# Patient Record
Sex: MALE | Race: OTHER | HISPANIC OR LATINO | Employment: UNEMPLOYED | URBAN - METROPOLITAN AREA
[De-identification: names, ages, dates, MRNs, and addresses within clinical notes are randomized per-mention and may not be internally consistent; named-entity substitution may affect disease eponyms.]

---

## 2019-01-01 ENCOUNTER — APPOINTMENT (OUTPATIENT)
Dept: NON INVASIVE DIAGNOSTICS | Facility: HOSPITAL | Age: 29
End: 2019-01-01
Payer: COMMERCIAL

## 2019-01-01 ENCOUNTER — HOSPITAL ENCOUNTER (OUTPATIENT)
Facility: HOSPITAL | Age: 29
Setting detail: OBSERVATION
Discharge: HOME | End: 2019-11-16
Attending: EMERGENCY MEDICINE | Admitting: INTERNAL MEDICINE
Payer: COMMERCIAL

## 2019-01-01 ENCOUNTER — HOSPITAL ENCOUNTER (EMERGENCY)
Facility: HOSPITAL | Age: 29
Discharge: HOME | End: 2019-01-10
Attending: EMERGENCY MEDICINE
Payer: COMMERCIAL

## 2019-01-01 ENCOUNTER — APPOINTMENT (EMERGENCY)
Dept: CT IMAGING | Facility: HOSPITAL | Age: 29
End: 2019-01-01
Payer: COMMERCIAL

## 2019-01-01 ENCOUNTER — APPOINTMENT (EMERGENCY)
Dept: RADIOLOGY | Facility: HOSPITAL | Age: 29
End: 2019-01-01
Payer: COMMERCIAL

## 2019-01-01 ENCOUNTER — HOSPITAL ENCOUNTER (EMERGENCY)
Facility: HOSPITAL | Age: 29
Discharge: HOME | End: 2019-01-30
Attending: EMERGENCY MEDICINE | Admitting: EMERGENCY MEDICINE
Payer: COMMERCIAL

## 2019-01-01 VITALS
RESPIRATION RATE: 14 BRPM | HEART RATE: 88 BPM | SYSTOLIC BLOOD PRESSURE: 128 MMHG | WEIGHT: 255 LBS | OXYGEN SATURATION: 100 % | TEMPERATURE: 98.1 F | DIASTOLIC BLOOD PRESSURE: 71 MMHG

## 2019-01-01 VITALS
TEMPERATURE: 99.4 F | SYSTOLIC BLOOD PRESSURE: 158 MMHG | OXYGEN SATURATION: 100 % | RESPIRATION RATE: 16 BRPM | DIASTOLIC BLOOD PRESSURE: 95 MMHG | HEART RATE: 110 BPM | WEIGHT: 220.24 LBS

## 2019-01-01 VITALS
OXYGEN SATURATION: 98 % | HEART RATE: 84 BPM | DIASTOLIC BLOOD PRESSURE: 70 MMHG | RESPIRATION RATE: 20 BRPM | SYSTOLIC BLOOD PRESSURE: 127 MMHG | TEMPERATURE: 98.3 F | HEIGHT: 65 IN | WEIGHT: 228 LBS | BODY MASS INDEX: 37.99 KG/M2

## 2019-01-01 DIAGNOSIS — R07.9 CHEST PAIN: Primary | ICD-10-CM

## 2019-01-01 DIAGNOSIS — S86.911A KNEE STRAIN, RIGHT, INITIAL ENCOUNTER: Primary | ICD-10-CM

## 2019-01-01 DIAGNOSIS — K42.9 UMBILICAL HERNIA: ICD-10-CM

## 2019-01-01 DIAGNOSIS — I30.0 ACUTE IDIOPATHIC PERICARDITIS: ICD-10-CM

## 2019-01-01 DIAGNOSIS — S86.911A KNEE STRAIN, RIGHT, INITIAL ENCOUNTER: ICD-10-CM

## 2019-01-01 DIAGNOSIS — R16.0 HEPATOMEGALY: ICD-10-CM

## 2019-01-01 DIAGNOSIS — R10.12 LEFT UPPER QUADRANT PAIN: Primary | ICD-10-CM

## 2019-01-01 DIAGNOSIS — R77.8 ELEVATED TROPONIN: ICD-10-CM

## 2019-01-01 LAB
ALBUMIN SERPL BCP-MCNC: 4.8 G/DL (ref 3–5.2)
ALP SERPL-CCNC: 121 U/L (ref 43–122)
ALT SERPL W P-5'-P-CCNC: 99 U/L (ref 9–52)
AMPHETAMINES UR QL SCN: NEGATIVE NG/ML
ANION GAP SERPL CALCULATED.3IONS-SCNC: 10 MMOL/L (ref 4–13)
ANION GAP SERPL CALCULATED.3IONS-SCNC: 11 MMOL/L (ref 4–13)
ANION GAP SERPL CALCULATED.3IONS-SCNC: 7 MMOL/L (ref 5–14)
AST SERPL W P-5'-P-CCNC: 58 U/L (ref 17–59)
ATRIAL RATE: 82 BPM
ATRIAL RATE: 85 BPM
ATRIAL RATE: 90 BPM
ATRIAL RATE: 92 BPM
BARBITURATES UR QL SCN: NEGATIVE NG/ML
BASOPHILS # BLD AUTO: 0.08 THOUSANDS/ΜL (ref 0–0.1)
BASOPHILS # BLD AUTO: 0.1 THOUSANDS/ΜL (ref 0–0.1)
BASOPHILS NFR BLD AUTO: 1 % (ref 0–1)
BASOPHILS NFR BLD AUTO: 1 % (ref 0–1)
BENZODIAZ UR QL: NEGATIVE NG/ML
BILIRUB SERPL-MCNC: 0.6 MG/DL
BILIRUB UR QL STRIP: NEGATIVE
BUN SERPL-MCNC: 10 MG/DL (ref 5–25)
BUN SERPL-MCNC: 11 MG/DL (ref 5–25)
BUN SERPL-MCNC: 14 MG/DL (ref 5–25)
BZE UR QL: NEGATIVE NG/ML
CALCIUM SERPL-MCNC: 9.3 MG/DL (ref 8.3–10.1)
CALCIUM SERPL-MCNC: 9.8 MG/DL (ref 8.4–10.2)
CALCIUM SERPL-MCNC: 9.9 MG/DL (ref 8.3–10.1)
CANNABINOIDS UR QL SCN: POSITIVE
CHLORIDE SERPL-SCNC: 101 MMOL/L (ref 97–108)
CHLORIDE SERPL-SCNC: 104 MMOL/L (ref 100–108)
CHLORIDE SERPL-SCNC: 98 MMOL/L (ref 100–108)
CHOLEST SERPL-MCNC: 216 MG/DL (ref 50–200)
CLARITY UR: CLEAR
CO2 SERPL-SCNC: 27 MMOL/L (ref 21–32)
CO2 SERPL-SCNC: 28 MMOL/L (ref 21–32)
CO2 SERPL-SCNC: 32 MMOL/L (ref 22–30)
COLOR UR: YELLOW
CREAT SERPL-MCNC: 0.8 MG/DL (ref 0.7–1.5)
CREAT SERPL-MCNC: 0.85 MG/DL (ref 0.6–1.3)
CREAT SERPL-MCNC: 0.87 MG/DL (ref 0.6–1.3)
CRP SERPL QL: <3 MG/L
EOSINOPHIL # BLD AUTO: 0.25 THOUSAND/ΜL (ref 0–0.61)
EOSINOPHIL # BLD AUTO: 0.4 THOUSAND/ΜL (ref 0–0.4)
EOSINOPHIL NFR BLD AUTO: 2 % (ref 0–6)
EOSINOPHIL NFR BLD AUTO: 4 % (ref 0–6)
ERYTHROCYTE [DISTWIDTH] IN BLOOD BY AUTOMATED COUNT: 12.4 % (ref 11.6–15.1)
ERYTHROCYTE [DISTWIDTH] IN BLOOD BY AUTOMATED COUNT: 12.5 % (ref 11.6–15.1)
ERYTHROCYTE [DISTWIDTH] IN BLOOD BY AUTOMATED COUNT: 13.3 %
ERYTHROCYTE [SEDIMENTATION RATE] IN BLOOD: 2 MM/HOUR (ref 0–10)
GFR SERPL CREATININE-BSD FRML MDRD: 117 ML/MIN/1.73SQ M
GFR SERPL CREATININE-BSD FRML MDRD: 119 ML/MIN/1.73SQ M
GFR SERPL CREATININE-BSD FRML MDRD: 122 ML/MIN/1.73SQ M
GLUCOSE SERPL-MCNC: 110 MG/DL (ref 65–140)
GLUCOSE SERPL-MCNC: 124 MG/DL (ref 65–140)
GLUCOSE SERPL-MCNC: 86 MG/DL (ref 70–99)
GLUCOSE UR STRIP-MCNC: NEGATIVE MG/DL
HCT VFR BLD AUTO: 46.4 % (ref 36.5–49.3)
HCT VFR BLD AUTO: 50.2 % (ref 36.5–49.3)
HCT VFR BLD AUTO: 50.7 % (ref 41–53)
HDLC SERPL-MCNC: 37 MG/DL
HGB BLD-MCNC: 15.4 G/DL (ref 12–17)
HGB BLD-MCNC: 16.7 G/DL (ref 12–17)
HGB BLD-MCNC: 16.9 G/DL (ref 13.5–17.5)
HGB UR QL STRIP.AUTO: NEGATIVE
IMM GRANULOCYTES # BLD AUTO: 0.05 THOUSAND/UL (ref 0–0.2)
IMM GRANULOCYTES NFR BLD AUTO: 0 % (ref 0–2)
KETONES UR STRIP-MCNC: NEGATIVE MG/DL
LEUKOCYTE ESTERASE UR QL STRIP: NEGATIVE
LIPASE SERPL-CCNC: 96 U/L (ref 23–300)
LYMPHOCYTES # BLD AUTO: 2.7 THOUSANDS/ΜL (ref 0.5–4)
LYMPHOCYTES # BLD AUTO: 3.44 THOUSANDS/ΜL (ref 0.6–4.47)
LYMPHOCYTES NFR BLD AUTO: 26 % (ref 14–44)
LYMPHOCYTES NFR BLD AUTO: 29 % (ref 25–45)
MCH RBC QN AUTO: 29.2 PG (ref 26.8–34.3)
MCH RBC QN AUTO: 29.3 PG (ref 26–34)
MCH RBC QN AUTO: 29.6 PG (ref 26.8–34.3)
MCHC RBC AUTO-ENTMCNC: 33.2 G/DL (ref 31.4–37.4)
MCHC RBC AUTO-ENTMCNC: 33.3 G/DL (ref 31.4–37.4)
MCHC RBC AUTO-ENTMCNC: 33.4 G/DL (ref 31–36)
MCV RBC AUTO: 88 FL (ref 80–100)
MCV RBC AUTO: 88 FL (ref 82–98)
MCV RBC AUTO: 89 FL (ref 82–98)
METHADONE UR QL SCN: NEGATIVE NG/ML
MONOCYTES # BLD AUTO: 0.7 THOUSAND/ΜL (ref 0.2–0.9)
MONOCYTES # BLD AUTO: 0.73 THOUSAND/ΜL (ref 0.17–1.22)
MONOCYTES NFR BLD AUTO: 5 % (ref 4–12)
MONOCYTES NFR BLD AUTO: 7 % (ref 1–10)
NEUTROPHILS # BLD AUTO: 5.5 THOUSANDS/ΜL (ref 1.8–7.8)
NEUTROPHILS # BLD AUTO: 8.94 THOUSANDS/ΜL (ref 1.85–7.62)
NEUTS SEG NFR BLD AUTO: 59 % (ref 45–65)
NEUTS SEG NFR BLD AUTO: 66 % (ref 43–75)
NITRITE UR QL STRIP: NEGATIVE
NONHDLC SERPL-MCNC: 179 MG/DL
NRBC BLD AUTO-RTO: 0 /100 WBCS
OPIATES UR QL: NEGATIVE NG/ML
P AXIS: 118 DEGREES
P AXIS: 47 DEGREES
P AXIS: 56 DEGREES
P AXIS: 65 DEGREES
PCP UR QL: NEGATIVE NG/ML
PH UR STRIP.AUTO: 8 [PH] (ref 4.5–8)
PLATELET # BLD AUTO: 411 THOUSANDS/UL (ref 149–390)
PLATELET # BLD AUTO: 412 THOUSANDS/UL (ref 150–450)
PLATELET # BLD AUTO: 469 THOUSANDS/UL (ref 149–390)
PMV BLD AUTO: 6.9 FL (ref 8.9–12.7)
PMV BLD AUTO: 8.4 FL (ref 8.9–12.7)
PMV BLD AUTO: 8.6 FL (ref 8.9–12.7)
POTASSIUM SERPL-SCNC: 3.5 MMOL/L (ref 3.5–5.3)
POTASSIUM SERPL-SCNC: 3.9 MMOL/L (ref 3.5–5.3)
POTASSIUM SERPL-SCNC: 4 MMOL/L (ref 3.6–5)
PR INTERVAL: 142 MS
PR INTERVAL: 152 MS
PR INTERVAL: 156 MS
PR INTERVAL: 158 MS
PROPOXYPH UR QL SCN: NEGATIVE NG/ML
PROT SERPL-MCNC: 8.3 G/DL (ref 5.9–8.4)
PROT UR STRIP-MCNC: NEGATIVE MG/DL
QRS AXIS: 142 DEGREES
QRS AXIS: 19 DEGREES
QRS AXIS: 29 DEGREES
QRS AXIS: 38 DEGREES
QRSD INTERVAL: 72 MS
QRSD INTERVAL: 78 MS
QRSD INTERVAL: 78 MS
QRSD INTERVAL: 80 MS
QT INTERVAL: 344 MS
QT INTERVAL: 346 MS
QT INTERVAL: 348 MS
QT INTERVAL: 352 MS
QTC INTERVAL: 411 MS
QTC INTERVAL: 414 MS
QTC INTERVAL: 420 MS
QTC INTERVAL: 427 MS
RBC # BLD AUTO: 5.21 MILLION/UL (ref 3.88–5.62)
RBC # BLD AUTO: 5.72 MILLION/UL (ref 3.88–5.62)
RBC # BLD AUTO: 5.78 MILLION/UL (ref 4.5–5.9)
SODIUM SERPL-SCNC: 137 MMOL/L (ref 136–145)
SODIUM SERPL-SCNC: 140 MMOL/L (ref 137–147)
SODIUM SERPL-SCNC: 141 MMOL/L (ref 136–145)
SP GR UR STRIP.AUTO: 1.02 (ref 1–1.04)
T WAVE AXIS: 15 DEGREES
T WAVE AXIS: 156 DEGREES
T WAVE AXIS: 34 DEGREES
T WAVE AXIS: 9 DEGREES
TRIGL SERPL-MCNC: 473 MG/DL
TROPONIN I SERPL-MCNC: 1.13 NG/ML
TROPONIN I SERPL-MCNC: 1.53 NG/ML
TROPONIN I SERPL-MCNC: 1.53 NG/ML
TROPONIN I SERPL-MCNC: 1.6 NG/ML
UROBILINOGEN UA: NEGATIVE MG/DL
VENTRICULAR RATE: 82 BPM
VENTRICULAR RATE: 85 BPM
VENTRICULAR RATE: 90 BPM
VENTRICULAR RATE: 92 BPM
WBC # BLD AUTO: 12.05 THOUSAND/UL (ref 4.31–10.16)
WBC # BLD AUTO: 13.49 THOUSAND/UL (ref 4.31–10.16)
WBC # BLD AUTO: 9.4 THOUSAND/UL (ref 4.5–11)

## 2019-01-01 PROCEDURE — 99284 EMERGENCY DEPT VISIT MOD MDM: CPT

## 2019-01-01 PROCEDURE — 85652 RBC SED RATE AUTOMATED: CPT | Performed by: PHYSICIAN ASSISTANT

## 2019-01-01 PROCEDURE — 80053 COMPREHEN METABOLIC PANEL: CPT | Performed by: PHYSICIAN ASSISTANT

## 2019-01-01 PROCEDURE — 84484 ASSAY OF TROPONIN QUANT: CPT | Performed by: PHYSICIAN ASSISTANT

## 2019-01-01 PROCEDURE — 93010 ELECTROCARDIOGRAM REPORT: CPT | Performed by: INTERNAL MEDICINE

## 2019-01-01 PROCEDURE — 73560 X-RAY EXAM OF KNEE 1 OR 2: CPT

## 2019-01-01 PROCEDURE — 93306 TTE W/DOPPLER COMPLETE: CPT

## 2019-01-01 PROCEDURE — 80307 DRUG TEST PRSMV CHEM ANLYZR: CPT | Performed by: STUDENT IN AN ORGANIZED HEALTH CARE EDUCATION/TRAINING PROGRAM

## 2019-01-01 PROCEDURE — 93306 TTE W/DOPPLER COMPLETE: CPT | Performed by: INTERNAL MEDICINE

## 2019-01-01 PROCEDURE — 96374 THER/PROPH/DIAG INJ IV PUSH: CPT

## 2019-01-01 PROCEDURE — 99283 EMERGENCY DEPT VISIT LOW MDM: CPT

## 2019-01-01 PROCEDURE — 83690 ASSAY OF LIPASE: CPT | Performed by: PHYSICIAN ASSISTANT

## 2019-01-01 PROCEDURE — 74177 CT ABD & PELVIS W/CONTRAST: CPT

## 2019-01-01 PROCEDURE — 36415 COLL VENOUS BLD VENIPUNCTURE: CPT | Performed by: PHYSICIAN ASSISTANT

## 2019-01-01 PROCEDURE — 96361 HYDRATE IV INFUSION ADD-ON: CPT

## 2019-01-01 PROCEDURE — 93005 ELECTROCARDIOGRAM TRACING: CPT

## 2019-01-01 PROCEDURE — 99285 EMERGENCY DEPT VISIT HI MDM: CPT

## 2019-01-01 PROCEDURE — 80048 BASIC METABOLIC PNL TOTAL CA: CPT | Performed by: PHYSICIAN ASSISTANT

## 2019-01-01 PROCEDURE — 71046 X-RAY EXAM CHEST 2 VIEWS: CPT

## 2019-01-01 PROCEDURE — 85027 COMPLETE CBC AUTOMATED: CPT | Performed by: PHYSICIAN ASSISTANT

## 2019-01-01 PROCEDURE — 99285 EMERGENCY DEPT VISIT HI MDM: CPT | Performed by: PHYSICIAN ASSISTANT

## 2019-01-01 PROCEDURE — 81003 URINALYSIS AUTO W/O SCOPE: CPT | Performed by: PHYSICIAN ASSISTANT

## 2019-01-01 PROCEDURE — 85025 COMPLETE CBC W/AUTO DIFF WBC: CPT | Performed by: PHYSICIAN ASSISTANT

## 2019-01-01 PROCEDURE — 96375 TX/PRO/DX INJ NEW DRUG ADDON: CPT

## 2019-01-01 PROCEDURE — 99241 PR OFFICE CONSULTATION NEW/ESTAB PATIENT 15 MIN: CPT | Performed by: INTERNAL MEDICINE

## 2019-01-01 PROCEDURE — 86140 C-REACTIVE PROTEIN: CPT | Performed by: STUDENT IN AN ORGANIZED HEALTH CARE EDUCATION/TRAINING PROGRAM

## 2019-01-01 PROCEDURE — 73564 X-RAY EXAM KNEE 4 OR MORE: CPT

## 2019-01-01 PROCEDURE — 80061 LIPID PANEL: CPT | Performed by: STUDENT IN AN ORGANIZED HEALTH CARE EDUCATION/TRAINING PROGRAM

## 2019-01-01 PROCEDURE — 71275 CT ANGIOGRAPHY CHEST: CPT

## 2019-01-01 PROCEDURE — 99236 HOSP IP/OBS SAME DATE HI 85: CPT | Performed by: STUDENT IN AN ORGANIZED HEALTH CARE EDUCATION/TRAINING PROGRAM

## 2019-01-01 RX ORDER — METOCLOPRAMIDE HYDROCHLORIDE 5 MG/ML
10 INJECTION INTRAMUSCULAR; INTRAVENOUS ONCE
Status: COMPLETED | OUTPATIENT
Start: 2019-01-01 | End: 2019-01-01

## 2019-01-01 RX ORDER — LIDOCAINE 50 MG/G
1 PATCH TOPICAL DAILY
Qty: 30 PATCH | Refills: 0 | Status: SHIPPED | OUTPATIENT
Start: 2019-01-01 | End: 2019-01-01 | Stop reason: HOSPADM

## 2019-01-01 RX ORDER — IBUPROFEN 400 MG/1
400 TABLET ORAL 3 TIMES DAILY
Status: DISCONTINUED | OUTPATIENT
Start: 2019-01-01 | End: 2019-01-01

## 2019-01-01 RX ORDER — NAPROXEN 500 MG/1
500 TABLET ORAL 2 TIMES DAILY WITH MEALS
Qty: 30 TABLET | Refills: 0 | Status: ON HOLD | OUTPATIENT
Start: 2019-01-01 | End: 2019-01-01 | Stop reason: SDUPTHER

## 2019-01-01 RX ORDER — IBUPROFEN 600 MG/1
600 TABLET ORAL ONCE
Status: COMPLETED | OUTPATIENT
Start: 2019-01-01 | End: 2019-01-01

## 2019-01-01 RX ORDER — ACETAMINOPHEN 325 MG/1
650 TABLET ORAL EVERY 6 HOURS PRN
Status: DISCONTINUED | OUTPATIENT
Start: 2019-01-01 | End: 2019-01-01 | Stop reason: HOSPADM

## 2019-01-01 RX ORDER — PANTOPRAZOLE SODIUM 20 MG/1
20 TABLET, DELAYED RELEASE ORAL
Qty: 10 TABLET | Refills: 0 | Status: SHIPPED | OUTPATIENT
Start: 2019-01-01

## 2019-01-01 RX ORDER — NAPROXEN 250 MG/1
500 TABLET ORAL 2 TIMES DAILY WITH MEALS
Status: DISCONTINUED | OUTPATIENT
Start: 2019-01-01 | End: 2019-01-01 | Stop reason: HOSPADM

## 2019-01-01 RX ORDER — LIDOCAINE 50 MG/G
1 PATCH TOPICAL ONCE
Status: DISCONTINUED | OUTPATIENT
Start: 2019-01-01 | End: 2019-01-01 | Stop reason: HOSPADM

## 2019-01-01 RX ORDER — ACETAMINOPHEN 325 MG/1
650 TABLET ORAL ONCE
Status: COMPLETED | OUTPATIENT
Start: 2019-01-01 | End: 2019-01-01

## 2019-01-01 RX ORDER — ASPIRIN 325 MG
325 TABLET ORAL ONCE
Status: COMPLETED | OUTPATIENT
Start: 2019-01-01 | End: 2019-01-01

## 2019-01-01 RX ORDER — ALBUTEROL SULFATE 90 UG/1
2 AEROSOL, METERED RESPIRATORY (INHALATION) EVERY 4 HOURS PRN
Status: DISCONTINUED | OUTPATIENT
Start: 2019-01-01 | End: 2019-01-01 | Stop reason: HOSPADM

## 2019-01-01 RX ORDER — ALBUTEROL SULFATE 2.5 MG/3ML
2.5 SOLUTION RESPIRATORY (INHALATION) EVERY 6 HOURS PRN
Status: DISCONTINUED | OUTPATIENT
Start: 2019-01-01 | End: 2019-01-01

## 2019-01-01 RX ORDER — KETOROLAC TROMETHAMINE 30 MG/ML
15 INJECTION, SOLUTION INTRAMUSCULAR; INTRAVENOUS ONCE
Status: COMPLETED | OUTPATIENT
Start: 2019-01-01 | End: 2019-01-01

## 2019-01-01 RX ORDER — COLCHICINE 0.6 MG/1
0.6 TABLET ORAL DAILY
Status: DISCONTINUED | OUTPATIENT
Start: 2019-01-01 | End: 2019-01-01 | Stop reason: HOSPADM

## 2019-01-01 RX ORDER — CALCIUM CARBONATE 200(500)MG
1000 TABLET,CHEWABLE ORAL DAILY PRN
Status: DISCONTINUED | OUTPATIENT
Start: 2019-01-01 | End: 2019-01-01 | Stop reason: HOSPADM

## 2019-01-01 RX ORDER — ONDANSETRON 2 MG/ML
4 INJECTION INTRAMUSCULAR; INTRAVENOUS EVERY 6 HOURS PRN
Status: DISCONTINUED | OUTPATIENT
Start: 2019-01-01 | End: 2019-01-01 | Stop reason: HOSPADM

## 2019-01-01 RX ORDER — PANTOPRAZOLE SODIUM 20 MG/1
20 TABLET, DELAYED RELEASE ORAL
Status: DISCONTINUED | OUTPATIENT
Start: 2019-01-01 | End: 2019-01-01 | Stop reason: HOSPADM

## 2019-01-01 RX ORDER — PANTOPRAZOLE SODIUM 40 MG/1
40 TABLET, DELAYED RELEASE ORAL
Status: DISCONTINUED | OUTPATIENT
Start: 2019-01-01 | End: 2019-01-01

## 2019-01-01 RX ORDER — KETOROLAC TROMETHAMINE 30 MG/ML
30 INJECTION, SOLUTION INTRAMUSCULAR; INTRAVENOUS EVERY 6 HOURS PRN
Status: DISCONTINUED | OUTPATIENT
Start: 2019-01-01 | End: 2019-01-01 | Stop reason: HOSPADM

## 2019-01-01 RX ORDER — DIPHENHYDRAMINE HYDROCHLORIDE 50 MG/ML
25 INJECTION INTRAMUSCULAR; INTRAVENOUS ONCE
Status: COMPLETED | OUTPATIENT
Start: 2019-01-01 | End: 2019-01-01

## 2019-01-01 RX ORDER — NAPROXEN 500 MG/1
500 TABLET ORAL 2 TIMES DAILY WITH MEALS
Qty: 20 TABLET | Refills: 0 | Status: SHIPPED | OUTPATIENT
Start: 2019-01-01

## 2019-01-01 RX ORDER — SODIUM CHLORIDE 9 MG/ML
125 INJECTION, SOLUTION INTRAVENOUS CONTINUOUS
Status: CANCELLED | OUTPATIENT
Start: 2019-01-01

## 2019-01-01 RX ORDER — COLCHICINE 0.6 MG/1
0.6 TABLET ORAL DAILY
Qty: 30 TABLET | Refills: 0 | Status: SHIPPED | OUTPATIENT
Start: 2019-01-01

## 2019-01-01 RX ORDER — TRAMADOL HYDROCHLORIDE 50 MG/1
50 TABLET ORAL EVERY 6 HOURS PRN
Qty: 20 TABLET | Refills: 0 | Status: SHIPPED | OUTPATIENT
Start: 2019-01-01 | End: 2019-01-01

## 2019-01-01 RX ORDER — NICOTINE 21 MG/24HR
1 PATCH, TRANSDERMAL 24 HOURS TRANSDERMAL DAILY
Status: DISCONTINUED | OUTPATIENT
Start: 2019-01-01 | End: 2019-01-01 | Stop reason: HOSPADM

## 2019-01-01 RX ADMIN — LIDOCAINE 1 PATCH: 50 PATCH TOPICAL at 16:10

## 2019-01-01 RX ADMIN — ACETAMINOPHEN 650 MG: 325 TABLET ORAL at 15:48

## 2019-01-01 RX ADMIN — KETOROLAC TROMETHAMINE 15 MG: 30 INJECTION, SOLUTION INTRAMUSCULAR at 19:52

## 2019-01-01 RX ADMIN — SODIUM CHLORIDE 1000 ML: 9 INJECTION, SOLUTION INTRAVENOUS at 14:32

## 2019-01-01 RX ADMIN — IOHEXOL 100 ML: 350 INJECTION, SOLUTION INTRAVENOUS at 15:26

## 2019-01-01 RX ADMIN — IBUPROFEN 600 MG: 600 TABLET ORAL at 22:37

## 2019-01-01 RX ADMIN — NICOTINE 1 PATCH: 14 PATCH TRANSDERMAL at 08:24

## 2019-01-01 RX ADMIN — KETOROLAC TROMETHAMINE 15 MG: 30 INJECTION, SOLUTION INTRAMUSCULAR; INTRAVENOUS at 14:30

## 2019-01-01 RX ADMIN — IBUPROFEN 400 MG: 400 TABLET ORAL at 08:47

## 2019-01-01 RX ADMIN — ACETAMINOPHEN 650 MG: 325 TABLET ORAL at 08:23

## 2019-01-01 RX ADMIN — DIPHENHYDRAMINE HYDROCHLORIDE 25 MG: 50 INJECTION, SOLUTION INTRAMUSCULAR; INTRAVENOUS at 19:51

## 2019-01-01 RX ADMIN — NAPROXEN 500 MG: 250 TABLET ORAL at 17:02

## 2019-01-01 RX ADMIN — COLCHICINE 0.6 MG: 0.6 TABLET, FILM COATED ORAL at 13:06

## 2019-01-01 RX ADMIN — SODIUM CHLORIDE 1000 ML: 0.9 INJECTION, SOLUTION INTRAVENOUS at 19:51

## 2019-01-01 RX ADMIN — IOHEXOL 85 ML: 350 INJECTION, SOLUTION INTRAVENOUS at 23:18

## 2019-01-01 RX ADMIN — METOCLOPRAMIDE 10 MG: 5 INJECTION, SOLUTION INTRAMUSCULAR; INTRAVENOUS at 19:51

## 2019-01-01 RX ADMIN — PANTOPRAZOLE SODIUM 40 MG: 40 TABLET, DELAYED RELEASE ORAL at 08:48

## 2019-01-01 RX ADMIN — ASPIRIN 325 MG ORAL TABLET 325 MG: 325 PILL ORAL at 21:18

## 2019-01-11 NOTE — ED PROVIDER NOTES
History  Chief Complaint   Patient presents with    Knee Pain     Pt reports right knee pain after bending over, then trying to stand, states "It just went out"  32 y/o male c/o R knee pain which began after attempting to change a car tire  Patient states that he bent over forwards to move tire and R knee twisted "sideways" and fell over  Patient states that he was able to stand afterwards and is ambulatory without assistance  He also drove himself to the ED  He states that he feels a "grinding" sensation around the lateral portion of the R knee  No pulse deficits on exam and patient has FROM  None       History reviewed  No pertinent past medical history  History reviewed  No pertinent surgical history  History reviewed  No pertinent family history  I have reviewed and agree with the history as documented  Social History   Substance Use Topics    Smoking status: Current Every Day Smoker     Packs/day: 0 50     Types: Cigarettes    Smokeless tobacco: Never Used    Alcohol use No        Review of Systems   Musculoskeletal: Positive for arthralgias and joint swelling  All other systems reviewed and are negative  Physical Exam  Physical Exam   Constitutional: He is oriented to person, place, and time  He appears well-developed and well-nourished  HENT:   Head: Normocephalic and atraumatic  Eyes: Pupils are equal, round, and reactive to light  EOM are normal    Neck: Normal range of motion  Neck supple  Cardiovascular: Normal rate, regular rhythm and normal heart sounds  Pulmonary/Chest: Effort normal and breath sounds normal    Abdominal: Soft  Bowel sounds are normal    Musculoskeletal: Normal range of motion  He exhibits tenderness          Right knee: He exhibits normal range of motion, no swelling, no effusion, no ecchymosis, no deformity, no laceration, no erythema, normal alignment, no LCL laxity, normal patellar mobility, no bony tenderness, normal meniscus and no MCL laxity  Tenderness found  Lateral joint line and LCL tenderness noted  No patellar tendon tenderness noted  Legs: Deepti's test wnl  Joint stable to varus/valgus stress without popliteal pulse deficit  Neurological: He is alert and oriented to person, place, and time  He displays normal reflexes  No sensory deficit  He exhibits normal muscle tone  Skin: Skin is warm and dry  Capillary refill takes less than 2 seconds  Psychiatric: He has a normal mood and affect  Vitals reviewed  Vital Signs  ED Triage Vitals [01/10/19 2140]   Temperature Pulse Respirations Blood Pressure SpO2   99 4 °F (37 4 °C) (!) 110 16 158/95 100 %      Temp Source Heart Rate Source Patient Position - Orthostatic VS BP Location FiO2 (%)   Tympanic Monitor Sitting Left arm --      Pain Score       9           Vitals:    01/10/19 2140   BP: 158/95   Pulse: (!) 110   Patient Position - Orthostatic VS: Sitting       Visual Acuity      ED Medications  Medications   ibuprofen (MOTRIN) tablet 600 mg (600 mg Oral Given 1/10/19 2237)       Diagnostic Studies  Results Reviewed     None                 XR knee 4+ views Right injury   ED Interpretation by Farida Gr DO (01/10 2249)   Normal 4V of R knee  No signs of fracture  XR knee 1 or 2 views RIGHT    (Results Pending)              Procedures  Procedures       Phone Contacts  ED Phone Contact    ED Course  ED Course as of Nimesh 10 2301   Thu Nimesh 10, 2019   2258 [place] view of R knee - wnl  MDM  Number of Diagnoses or Management Options  Diagnosis management comments: Patient injury non-traumatic; good pulses and sensation intact per neuro exam testing two-point discrimination and sensation  No clinical indication for popliteal artery imaging        CritCare Time    Disposition  Final diagnoses:   Knee strain, right, initial encounter     Time reflects when diagnosis was documented in both MDM as applicable and the Disposition within this note     Time User Action Codes Description Comment    1/10/2019 10:59 PM Mary Johnson Add [P63 664Y] Knee strain, right, initial encounter       ED Disposition     ED Disposition Condition Comment    Discharge  Kranthi Damon discharge to home/self care  Condition at discharge: Good        Follow-up Information     Follow up With Specialties Details Why Contact Info    Siddharth Abraham MD Family Medicine In 1 week As needed [facility]            Patient's Medications   Discharge Prescriptions    NAPROXEN (NAPROSYN) 500 MG TABLET    Take 1 tablet (500 mg total) by mouth 2 (two) times a day with meals       Start Date: 1/10/2019 End Date: --       Order Dose: 500 mg       Quantity: 30 tablet    Refills: 0     No discharge procedures on file      ED Provider  Electronically Signed by           Lola Can DO  01/10/19 1560

## 2019-01-11 NOTE — DISCHARGE INSTRUCTIONS
Knee Exercises   AMBULATORY CARE:   What you need to know about knee exercises:  Knee exercises help strengthen the muscles around your knee  Strong muscles can help reduce pain and decrease your risk of future injury  Knee exercises also help you heal after an injury or surgery  · Start slow  These are beginning exercises  Ask your healthcare provider if you need to see a physical therapist for more advanced exercises  As you get stronger, you may be able to do more sets of each exercise or add weights  · Stop if you feel pain  It is normal to feel some discomfort at first  Regular exercise will help decrease your discomfort over time  · Do the exercises on both legs  Do this so both knees remain strong  · Warm up before you do knee exercises  Walk or ride a stationary bike for 5 or 10 minutes to warm your muscles  How to perform knee stretches safely:  Always stretch before you do strengthening exercises  Do these stretching exercises again after you do the strengthening exercises  Do these stretches 4 or 5 days a week, or as directed  · Standing calf stretch: Face a wall and place both palms flat on the wall, or hold the back of a chair for balance  Keep a slight bend in your knees  Take a big step backward with one leg  Keep your other leg directly under you  Keep both heels flat and press your hips forward  Hold the stretch for 30 seconds, and then relax for 30 seconds  Switch legs  Repeat 2 or 3 times on each leg  · Standing quadriceps stretch:  Stand and place one hand against a wall or hold the back of a chair for balance  With your weight on one leg, bend your other leg and grab your ankle  Bring your heel toward your buttocks  Hold the stretch for 30 to 60 seconds  Switch legs  Repeat 2 or 3 times on each leg  · Sitting hamstring stretch:  Sit with both legs straight in front of you  Do not point or flex your toes   Place your palms on the floor and slide your hands forward until you feel the stretch  Do not round your back  Hold the stretch for 30 seconds  Repeat 2 or 3 times  How to perform knee strengthening exercises safely:  Do these exercises 4 or 5 days a week, or as directed  · Standing half squats:  Stand with your feet shoulder-width apart  Lean your back against a wall or hold the back of a chair for balance, if needed  Slowly sit down about 10 inches, as if you are going to sit in a chair  Your body weight should be mostly over your heels  Hold the squat for 5 seconds, then rise to a standing position  Do 3 sets of 10 squats to strengthen your buttocks and thighs  · Standing hamstring curls: Face a wall and place both palms flat on the wall, or hold the back of a chair for balance  With your weight on one leg, lift your other foot as close to your buttocks as you can  Hold for 5 seconds and then lower your leg  Do 2 sets of 10 curls on each leg  This exercise strengthens the muscles in the back of your thigh  · Standing calf raises:  Face a wall and place both palms flat on the wall, or hold the back of a chair for balance  Stand up straight, and do not lean  Place all your weight on one leg by lifting the other foot off the floor  Raise the heel of the foot that is on the floor as high as you can and then lower it  Do 2 sets of 10 calf raises on each leg to strengthen your calf muscles  · Straight leg lifts:  Lie on your stomach with straight legs  Fold your arms in front of you and rest your head in your arms  Tighten your leg muscles and raise one leg as high as you can  Hold for 5 seconds, then lower your leg  Do 2 sets of 10 lifts on each leg to strengthen your buttocks  · Sitting leg lifts:  Sit in a chair  Slowly straighten and raise one leg  Squeeze your thigh muscles and hold for 5 seconds  Relax and return your foot to the floor  Do 2 sets of 10 lifts on each leg   This helps strengthen the muscles in the front of your thigh  Contact your healthcare provider if:   · You have new pain or your pain becomes worse  · You have questions or concerns about your condition or care  © 2017 [address] [employer] Information is for End User's use only and may not be sold, redistributed or otherwise used for commercial purposes  All illustrations and images included in CareNotes® are the copyrighted property of A D A M , Inc  or Tunde Stephens  The above information is an  only  It is not intended as medical advice for individual conditions or treatments  Talk to your doctor, nurse or pharmacist before following any medical regimen to see if it is safe and effective for you

## 2019-01-30 NOTE — ED NOTES
Patient transported to [address] Paez, RN  01/30/19 [facility], [address] [place] [address]  01/30/19 6764

## 2019-01-30 NOTE — ED NOTES
Pt continues to state that his pain is the same when he is standing        Juliana Laguna, JACQUI  01/30/19 0925

## 2019-01-30 NOTE — DISCHARGE INSTRUCTIONS
Abdominal Pain   WHAT YOU NEED TO KNOW:   Abdominal pain can be dull, achy, or sharp  You may have pain in one area of your abdomen, or in your entire abdomen  Your pain may be caused by a condition such as constipation, food sensitivity or poisoning, infection, or a blockage  Abdominal pain can also be from a hernia, appendicitis, or an ulcer  Liver, gallbladder, or kidney conditions can also cause abdominal pain  The cause of your abdominal pain may be unknown  DISCHARGE INSTRUCTIONS:   Return to the emergency department if:   · You have new chest pain or shortness of breath  · You have pulsing pain in your upper abdomen or lower back that suddenly becomes constant  · Your pain is in the right lower abdominal area and worsens with movement  · You have a fever over 100 4°F (38°C) or shaking chills  · You are vomiting and cannot keep food or liquids down  · Your pain does not improve or gets worse over the next 8 to 12 hours  · You see blood in your vomit or bowel movements, or they look black and tarry  · Your skin or the whites of your eyes turn yellow  · You are a woman and have a large amount of vaginal bleeding that is not your monthly period  Contact your healthcare provider if:   · You have pain in your lower back  · You are a man and have pain in your testicles  · You have pain when you urinate  · You have questions or concerns about your condition or care  Follow up with your healthcare provider within 24 hours or as directed:  Write down your questions so you remember to ask them during your visits  Medicines:   · Medicines  may be given to calm your stomach and prevent vomiting or to decrease pain  Ask how to take pain medicine safely  · Take your medicine as directed  Contact your healthcare provider if you think your medicine is not helping or if you have side effects  Tell him of her if you are allergic to any medicine   Keep a list of the medicines, vitamins, and herbs you take  Include the amounts, and when and why you take them  Bring the list or the pill bottles to follow-up visits  Carry your medicine list with you in case of an emergency  © 2017 [address] [employer] Information is for End User's use only and may not be sold, redistributed or otherwise used for commercial purposes  All illustrations and images included in CareNotes® are the copyrighted property of A D A M , Inc  or Tunde Stephens  The above information is an  only  It is not intended as medical advice for individual conditions or treatments  Talk to your doctor, nurse or pharmacist before following any medical regimen to see if it is safe and effective for you

## 2019-01-30 NOTE — ED PROVIDER NOTES
History  Chief Complaint   Patient presents with    Abdominal Pain     feel lump to left side abd since yesterday  no n/v/d     27-year-old male presenting with left upper abdominal pain starting yesterday  He states that he sneezed yesterday and noticed a pain in left upper quadrant  He states that he woke up today, sitting up from bed and had increased pain in that left upper quadrant  He states he did not take anything for the pain prior to arrival   He states that he feels a lump underneath the skin that is very painful to palpation  He denies any nausea vomiting diarrhea constipation dysuria hematuria chest pain shortness of breath  Denies any motor vehicle accidents trauma or injury to the abdomen  Prior to Admission Medications   Prescriptions Last Dose Informant Patient Reported? Taking?   naproxen (NAPROSYN) 500 mg tablet   No No   Sig: Take 1 tablet (500 mg total) by mouth 2 (two) times a day with meals      Facility-Administered Medications: None       Past Medical History:   Diagnosis Date    Asthma        History reviewed  No pertinent surgical history  History reviewed  No pertinent family history  I have reviewed and agree with the history as documented  Social History   Substance Use Topics    Smoking status: Current Every Day Smoker     Packs/day: 0 20     Types: Cigarettes    Smokeless tobacco: Never Used    Alcohol use No        Review of Systems   All other systems reviewed and are negative  Physical Exam  Physical Exam   Constitutional: He is oriented to person, place, and time  He appears well-developed and well-nourished  No distress  HENT:   Head: Normocephalic and atraumatic  Eyes: Conjunctivae are normal    EOM grossly intact   Neck: Normal range of motion  Neck supple  No JVD present  Cardiovascular: Normal rate  Pulmonary/Chest: Effort normal    Abdominal: Soft  He exhibits no distension         Tenderness to palpation LUQ, no RLQ tenderness to palaption   Musculoskeletal:   FROM, steady gait, cap refill brisk, strength and sensation grossly intact throughout   Lymphadenopathy:     He has no cervical adenopathy  Neurological: He is alert and oriented to person, place, and time  Skin: Skin is warm and dry  Capillary refill takes less than 2 seconds  Psychiatric: He has a normal mood and affect  His behavior is normal    Nursing note and vitals reviewed        Vital Signs  ED Triage Vitals   Temperature Pulse Respirations Blood Pressure SpO2   01/30/19 1339 01/30/19 1339 01/30/19 1339 01/30/19 1339 01/30/19 1339   98 1 °F (36 7 °C) 99 18 158/81 100 %      Temp Source Heart Rate Source Patient Position - Orthostatic VS BP Location FiO2 (%)   01/30/19 1339 -- 01/30/19 1339 01/30/19 1339 --   Tympanic  Lying Left arm       Pain Score       01/30/19 1430       Worst Possible Pain           Vitals:    01/30/19 1339 01/30/19 1551   BP: 158/81 128/71   Pulse: 99 88   Patient Position - Orthostatic VS: Lying        Visual Acuity      ED Medications  Medications   lidocaine (LIDODERM) 5 % patch 1 patch (1 patch Topical Medication Applied 1/30/19 1610)   sodium chloride 0 9 % bolus 1,000 mL (0 mL Intravenous Stopped 1/30/19 1548)   ketorolac (TORADOL) injection 15 mg (15 mg Intravenous Given 1/30/19 1430)   acetaminophen (TYLENOL) tablet 650 mg (650 mg Oral Given 1/30/19 1548)   iohexol (OMNIPAQUE) 350 MG/ML injection (MULTI-DOSE) 100 mL (100 mL Intravenous Given 1/30/19 1526)       Diagnostic Studies  Results Reviewed     Procedure Component Value Units Date/Time    UA w Reflex to Microscopic w Reflex to Culture [[id number]]  (Normal) Collected:  01/30/19 1504    Lab Status:  Final result Specimen:  Urine from Urine, Other Updated:  01/30/19 1535     Color, UA Yellow     Clarity, UA Clear     Specific [place], UA 1 020     pH, UA 8 0     Leukocytes, UA Negative     Nitrite, UA Negative     Protein, UA Negative mg/dl      Glucose, UA Negative mg/dl      Ketones, UA Negative mg/dl      Bilirubin, UA Negative     Blood, UA Negative     UROBILINOGEN UA Negative mg/dL     Lipase [[id number]]  (Normal) Collected:  01/30/19 1426    Lab Status:  Final result Specimen:  Blood from Arm, Left Updated:  01/30/19 1505     Lipase 96 u/L     Comprehensive metabolic panel [[id number]]  (Abnormal) Collected:  01/30/19 1426    Lab Status:  Final result Specimen:  Blood from Arm, Left Updated:  01/30/19 1505     Sodium 140 mmol/L      Potassium 4 0 mmol/L      Chloride 101 mmol/L      CO2 32 (H) mmol/L      ANION GAP 7 mmol/L      BUN 10 mg/dL      Creatinine 0 80 mg/dL      Glucose 86 mg/dL      Calcium 9 8 mg/dL      AST 58 U/L      ALT 99 (H) U/L      Alkaline Phosphatase 121 U/L      Total Protein 8 3 g/dL      Albumin 4 8 g/dL      Total Bilirubin 0 60 mg/dL      eGFR 122 ml/min/1 73sq m     Narrative:         National Kidney Disease Education Program recommendations are as follows:  GFR calculation is accurate only with a steady state creatinine  Chronic Kidney disease less than 60 ml/min/1 73 sq  meters  Kidney failure less than 15 ml/min/1 73 sq  meters  CBC and differential [[id number]]  (Abnormal) Collected:  01/30/19 1426    Lab Status:  Final result Specimen:  Blood from Arm, Left Updated:  01/30/19 1455     WBC 9 40 Thousand/uL      RBC 5 78 Million/uL      Hemoglobin 16 9 g/dL      Hematocrit 50 7 %      MCV 88 fL      MCH 29 3 pg      MCHC 33 4 g/dL      RDW 13 3 %      MPV 6 9 (L) fL      Platelets 643 Thousands/uL      Neutrophils Relative 59 %      Lymphocytes Relative 29 %      Monocytes Relative 7 %      Eosinophils Relative 4 %      Basophils Relative 1 %      Neutrophils Absolute 5 50 Thousands/µL      Lymphocytes Absolute 2 70 Thousands/µL      Monocytes Absolute 0 70 Thousand/µL      Eosinophils Absolute 0 40 Thousand/µL      Basophils Absolute 0 10 Thousands/µL                  CT abdomen pelvis with contrast   Final Result by Bridgett Wiley MD (01/30 0574)      1  No acute abnormality in the abdomen or pelvis  2   Mild hepatomegaly  I personally discussed this study with Kit Tesfaye on 1/30/2019 at 3:43 PM             Workstation performed: [id number]                    Procedures  Procedures       Phone Contacts  ED Phone Contact    ED Course  ED Course as of Jan 30 1619 Wed Jan 30, 2019   1510 Pt stating pain only relieved to 8/10 from 10/10, will give tylenol and reassess after imaging results                                MDM  Number of Diagnoses or Management Options  Hepatomegaly:   Left upper quadrant pain:   Umbilical hernia:   Diagnosis management comments: 52-year-old male presenting with left upper quadrant pain and small nodule palpated just yesterday, patient's labs were within normal limits, no abnormalities within the left upper quadrant visualized on CT abdomen and pelvis, patient was found to have hepatomegaly and umbilical hernia, will send patient home with analgesia and instructions to follow up with GI outpatient for possible ultrasound otherwise he is afebrile, non toxic appearing, no acute distress, f/u with pcp as needed outpatient    All labs and imaging discussed with patient, strict return to ED precautions discussed  Pt verbalizes understanding and agrees with plan  Pt is stable for discharge    Portions of the record may have been created with voice recognition software  Occasional wrong word or "sound a like" substitutions may have occurred due to the inherent limitations of voice recognition software  Read the chart carefully and recognize, using context, where substitutions have occurred          Disposition  Final diagnoses:   Left upper quadrant pain   Umbilical hernia   Hepatomegaly     Time reflects when diagnosis was documented in both MDM as applicable and the Disposition within this note     Time User Action Codes Description Comment    1/30/2019  4:05 PM Dayday Zafar Add [R10 12] Left upper quadrant pain 1/30/2019  4:06 PM Kvng PAEZ Add [L08 5] Umbilical hernia     7/00/1400  4:06 PM Sheila Cai Add [R16 0] Hepatomegaly       ED Disposition     ED Disposition Condition Date/Time Comment    Discharge  Wed Jan 30, 2019  4:05 PM Johnnyjulio césar Albert discharge to home/self care  Condition at discharge: Good        Follow-up Information     Follow up With Specialties Details Why Contact Info Additional Information    Omid Bhatia MD Family Medicine Schedule an appointment as soon as possible for a visit As needed [facility] Schedule an appointment as soon as possible for a visit As needed [address] [facility] [address] [facility] [place], [address] [place] [address], [phone]          Patient's Medications   Discharge Prescriptions    LIDOCAINE (LIDODERM) 5 %    Apply 1 patch topically daily Remove & Discard patch within 12 hours or as directed by MD       Start Date: 1/30/2019 End Date: --       Order Dose: 1 patch       Quantity: 30 patch    Refills: 0    TRAMADOL (ULTRAM) 50 MG TABLET    Take 1 tablet (50 mg total) by mouth every 6 (six) hours as needed for moderate pain for up to 10 days       Start Date: 1/30/2019 End Date: 2/9/2019       Order Dose: 50 mg       Quantity: 20 tablet    Refills: 0     No discharge procedures on file      ED Provider  Electronically Signed by           Nona Barrett PA-C  01/30/19 2437

## 2019-11-16 PROBLEM — J45.909 ASTHMA: Status: ACTIVE | Noted: 2019-01-01

## 2019-11-16 PROBLEM — Z72.0 TOBACCO ABUSE: Status: ACTIVE | Noted: 2019-01-01

## 2019-11-16 PROBLEM — R77.8 ELEVATED TROPONIN: Status: ACTIVE | Noted: 2019-01-01

## 2019-11-16 NOTE — H&P
H&P- Konstantin Ziegler 1990, 30 y o  male MRN: [id number]    Unit/Bed#: E4 -01 Encounter: [id number]    Primary Care Provider: Bill Bolanos PA-C   Date and time admitted to hospital: 11/15/2019  7:20 PM        * Elevated troponin  Assessment & Plan  Admit to med/surg on telemetry  EKG normal  Initial troponin 1 13, repeat 1 6  ED spoke with cardiology  Will order SED rate and NSAIDS for possible pericarditis  Consider ECHO  Consult cardiology  Trend troponin    Tobacco abuse  Assessment & Plan  Order nicotine patch    Asthma  Assessment & Plan  Order albuterol PRN          VTE Prophylaxis: Pharmacologic VTE Prophylaxis contraindicated due to low risk  / reason for no mechanical VTE prophylaxis low risk   Code Status: full code  POLST: There is no POLST form on file for this patient (pre-hospital)  Discussion with family: parents at bedside    Anticipated Length of Stay:  Patient will be admitted on an Observation basis with an anticipated length of stay of  Less than 2 midnights  Justification for Hospital Stay: patient requires serial troponin and cardiology consult    Total Time for Visit, including Counseling / Coordination of Care: 45 minutes  Greater than 50% of this total time spent on direct patient counseling and coordination of care  Chief Complaint:   Chest pain    History of Present Illness:    Konstantin Ziegler is a 30 y o  male who presents with chest pain  Intermittent for 3 days  Pain lasts 5-10 minutes then resolves  It is not activity related  Pain is sharp and stabbing  Pain is on the left side of his chest and radiates into the left neck and up into his head  No cough  Review of Systems:    Review of Systems   Constitutional: Negative  HENT: Negative  Eyes: Negative  Respiratory: Positive for shortness of breath  Cardiovascular: Positive for chest pain  Gastrointestinal: Negative  Endocrine: Negative  Genitourinary: Negative      Musculoskeletal: Negative  Skin: Negative  Allergic/Immunologic: Negative  Neurological: Positive for headaches  Hematological: Negative  Psychiatric/Behavioral: Negative  Past Medical and Surgical History:     Past Medical History:   Diagnosis Date    Asthma        History reviewed  No pertinent surgical history  Meds/Allergies:    Prior to Admission medications    Medication Sig Start Date End Date Taking? 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  Patient Pre-hospital Living Situation: lives with family  Patient Pre-hospital Level of Mobility: ambulatory  Patient Pre-hospital Diet Restrictions: none  Substance Use History:   Social History     Substance and Sexual Activity   Alcohol Use No     Social History     Tobacco Use   Smoking Status Current Every Day Smoker    Packs/day: 0 20    Types: Cigarettes   Smokeless Tobacco Never Used     Social History     Substance and Sexual Activity   Drug Use No       Family History:    non-contributory    Physical Exam:     Vitals:   Blood Pressure: 124/80 (11/15/19 2335)  Pulse: 91 (11/15/19 2335)  Respirations: 18 (11/15/19 2335)  Weight - Scale: 102 kg (225 lb 5 oz) (11/15/19 1820)  SpO2: 99 % (11/15/19 2335)    Physical Exam   Constitutional: He is oriented to person, place, and time  He appears well-developed and well-nourished  No distress  HENT:   Head: Normocephalic and atraumatic     Eyes: Pupils are equal, round, and reactive to light  EOM are normal    Neck: Normal range of motion  Neck supple  Cardiovascular: Normal rate and regular rhythm  Pulmonary/Chest: Effort normal and breath sounds normal    Pain with palpation anterior chest wall   Abdominal: Soft  Bowel sounds are normal    Musculoskeletal: Normal range of motion  He exhibits no edema or deformity  Neurological: He is alert and oriented to person, place, and time  Skin: Skin is warm and dry  He is not diaphoretic  Psychiatric: He has a normal mood and affect  His behavior is normal    Vitals reviewed  Additional Data:     Lab Results: I have personally reviewed pertinent reports  Results from last 7 days   Lab Units 11/15/19  1949   WBC Thousand/uL 13 49*   HEMOGLOBIN g/dL 16 7   HEMATOCRIT % 50 2*   PLATELETS Thousands/uL 469*   NEUTROS PCT % 66   LYMPHS PCT % 26   MONOS PCT % 5   EOS PCT % 2     Results from last 7 days   Lab Units 11/15/19  1949   SODIUM mmol/L 137   POTASSIUM mmol/L 3 5   CHLORIDE mmol/L 98*   CO2 mmol/L 28   BUN mg/dL 11   CREATININE mg/dL 0 85   ANION GAP mmol/L 11   CALCIUM mg/dL 9 9   GLUCOSE RANDOM mg/dL 110                       Imaging: I have personally reviewed pertinent reports  CTA ED chest PE Study   Final Result by Shreyas Rojas MD (11/15 2331)      No evidence of pulmonary embolus                  Workstation performed: [id number]         XR chest 2 views   ED Interpretation by Ziggy Ray PA-C (11/15 2022)   No acute cardiopulmonary disease seen by me at this time          EKG, Pathology, and Other Studies Reviewed on Admission:   · EKG: NSR    Allscripts / Epic Records Reviewed: Yes     ** Please Note: This note has been constructed using a voice recognition system   **

## 2019-11-16 NOTE — ASSESSMENT & PLAN NOTE
Admit to med/surg on telemetry  EKG normal  Initial troponin 1 13, repeat 1 6  ED spoke with cardiology   Will order SED rate and NSAIDS for possible pericarditis  Consider ECHO  Consult cardiology  Trend troponin

## 2019-11-16 NOTE — ASSESSMENT & PLAN NOTE
20-year-old male coming in with chest pain and elevated troponin suspected to be due to acute idiopathic pericarditis  - discharge patient with naproxen 500 mg twice a day for 10 days and colchicine 0 6 mg for 30 days as per Cardiology recommendation  - ordered pantoprazole 20 mg daily for 10 days while he is on naproxen

## 2019-11-16 NOTE — ASSESSMENT & PLAN NOTE
Echo results reviewed with cardiology  No evidence of ischemia      Recent Labs     11/15/19  2248 11/16/19  0156 11/16/19  0510   TROPONINI 1 60* 1 53* 1 53*

## 2019-11-16 NOTE — ED PROVIDER NOTES
History  Chief Complaint   Patient presents with    Chest Pain     Patient reporting CP for the past 3 days intermittently  Denies any recent cough  Reports pain is worse when he takes a deep breath  Michael Murphy is a 25-year-old male presenting with 3 days of intermittent, sharp, pleuritic midsternal chest pain  Denies improvement/worsening of pain with position  Patient states the pain began while he was sitting down at home  Patient denies cough, denies shortness of breath or wheezing  Denies radiation of chest pain or back pain  Denies fevers or chills  Denies body aches or myalgias  Denies prior history of similar symptoms  No medications prior to arrival for pain  Denies lower extremity edema or pain  Denies prior history of DVT or PE  No recent surgery or trauma  No recent immobilization  Patient does describe family history of early cardiac disease on his mother side  Patient also reports 1 day of generalized headache  Patient describes prior history of similar headaches, as well as remote history of migraines  Patient states headache is similar to prior episodes  Denies blurry or double vision  Denies numbness, tingling, or weakness  Denies neck pain or stiffness  No medications prior to arrival for headache        History provided by:  Patient   used: No    Chest Pain   Pain location:  Substernal area  Pain quality: sharp    Pain radiates to:  Does not radiate  Pain radiates to the back: no    Duration:  3 days  Timing:  Intermittent  Progression:  Waxing and waning  Chronicity:  New  Context: breathing    Relieved by:  None tried  Worsened by:  Deep breathing  Ineffective treatments:  None tried  Associated symptoms: no abdominal pain, no cough, no dizziness, no fever, no headache, no heartburn, no nausea, no numbness, no palpitations, no shortness of breath, not vomiting and no weakness    Risk factors: no aortic disease, no diabetes mellitus, no immobilization and no prior DVT/PE        Prior to Admission Medications   Prescriptions Last Dose Informant Patient Reported? Taking?   lidocaine (LIDODERM) 5 %   No No   Sig: Apply 1 patch topically daily Remove & Discard patch within 12 hours or as directed by MD   naproxen (NAPROSYN) 500 mg tablet   No No   Sig: Take 1 tablet (500 mg total) by mouth 2 (two) times a day with meals      Facility-Administered Medications: None       Past Medical History:   Diagnosis Date    Asthma        History reviewed  No pertinent surgical history  History reviewed  No pertinent family history  I have reviewed and agree with the history as documented  Social History     Tobacco Use    Smoking status: Current Every Day Smoker     Packs/day: 0 20     Types: Cigarettes    Smokeless tobacco: Never Used   Substance Use Topics    Alcohol use: No    Drug use: No        Review of Systems   Constitutional: Negative for chills and fever  HENT: Negative for congestion, ear discharge, ear pain, mouth sores, rhinorrhea, sinus pressure, sinus pain, sore throat and voice change  Eyes: Negative for pain, redness and visual disturbance  Respiratory: Negative for cough, chest tightness, shortness of breath and wheezing  Cardiovascular: Positive for chest pain  Negative for palpitations  Gastrointestinal: Negative for abdominal pain, constipation, diarrhea, heartburn, nausea and vomiting  Genitourinary: Negative for dysuria, frequency and urgency  Musculoskeletal: Negative for myalgias, neck pain and neck stiffness  Skin: Negative for pallor, rash and wound  Neurological: Negative for dizziness, weakness, light-headedness, numbness and headaches  Physical Exam  Physical Exam   Constitutional: He is oriented to person, place, and time  He appears well-developed and well-nourished  Non-toxic appearance  No distress  HENT:   Head: Normocephalic and atraumatic     Right Ear: Tympanic membrane, external ear and ear canal normal  Left Ear: Tympanic membrane, external ear and ear canal normal    Nose: Nose normal    Mouth/Throat: Oropharynx is clear and moist    Eyes: Pupils are equal, round, and reactive to light  Conjunctivae and EOM are normal    Neck: Normal range of motion  Neck supple  Cardiovascular: Normal rate, regular rhythm and normal heart sounds  Exam reveals no gallop and no friction rub  No murmur heard  Pulmonary/Chest: Effort normal and breath sounds normal  No stridor  No respiratory distress  He has no wheezes  He has no rales  He exhibits tenderness (TTP over mid/L sternal region)  Abdominal: Soft  Bowel sounds are normal  He exhibits no distension  There is no tenderness  There is no guarding  Musculoskeletal:        Right lower leg: He exhibits no tenderness and no edema  Left lower leg: He exhibits no tenderness and no edema  Negative Homans bilaterally   Lymphadenopathy:     He has no cervical adenopathy  Neurological: He is alert and oriented to person, place, and time  He has normal strength  He displays normal reflexes  No cranial nerve deficit or sensory deficit  He exhibits normal muscle tone  Coordination normal    Skin: Skin is warm and dry  Capillary refill takes less than 2 seconds  No rash noted  No erythema  No pallor  Psychiatric: He has a normal mood and affect  His behavior is normal  Judgment and thought content normal    Nursing note and vitals reviewed        Vital Signs  ED Triage Vitals [11/15/19 1821]   Temp Pulse Respirations Blood Pressure SpO2   -- 98 16 130/88 100 %      Temp src Heart Rate Source Patient Position - Orthostatic VS BP Location FiO2 (%)   -- Monitor Sitting Right arm --      Pain Score       Worst Possible Pain           Vitals:    11/15/19 1821 11/15/19 2014 11/15/19 2127 11/15/19 2335   BP: 130/88 135/65 118/62 124/80   Pulse: 98 93 78 91   Patient Position - Orthostatic VS: Sitting Lying Lying Sitting         Visual Acuity      ED Medications  Medications sodium chloride 0 9 % bolus 1,000 mL (0 mL Intravenous Stopped 11/15/19 2119)   ketorolac (TORADOL) injection 15 mg (15 mg Intravenous Given 11/15/19 1952)   metoclopramide (REGLAN) injection 10 mg (10 mg Intravenous Given 11/15/19 1951)   diphenhydrAMINE (BENADRYL) injection 25 mg (25 mg Intravenous Given 11/15/19 1951)   aspirin tablet 325 mg (325 mg Oral Given 11/15/19 2118)   iohexol (OMNIPAQUE) 350 MG/ML injection (MULTI-DOSE) 85 mL (85 mL Intravenous Given 11/15/19 2318)       Diagnostic Studies  Results Reviewed     Procedure Component Value Units Date/Time    Troponin I [[id number]]  (Abnormal) Collected:  11/15/19 2248    Lab Status:  Final result Specimen:  Blood from Arm, Right Updated:  11/15/19 2317     Troponin I 1 60 ng/mL     Sedimentation rate, automated [[id number]]  (Normal) Collected:  11/15/19 1949    Lab Status:  Final result Specimen:  Blood from Arm, Right Updated:  11/15/19 2245     Sed Rate 2 mm/hour     Troponin I [[id number]]  (Abnormal) Collected:  11/15/19 1949    Lab Status:  Final result Specimen:  Blood from Arm, Right Updated:  11/15/19 2040     Troponin I 1 13 ng/mL     Basic metabolic panel [[id number]]  (Abnormal) Collected:  11/15/19 1949    Lab Status:  Final result Specimen:  Blood from Arm, Right Updated:  11/15/19 2030     Sodium 137 mmol/L      Potassium 3 5 mmol/L      Chloride 98 mmol/L      CO2 28 mmol/L      ANION GAP 11 mmol/L      BUN 11 mg/dL      Creatinine 0 85 mg/dL      Glucose 110 mg/dL      Calcium 9 9 mg/dL      eGFR 119 ml/min/1 73sq m     Narrative:       Jaden guidelines for Chronic Kidney Disease (CKD):     Stage 1 with normal or high GFR (GFR > 90 mL/min/1 73 square meters)    Stage 2 Mild CKD (GFR = 60-89 mL/min/1 73 square meters)    Stage 3A Moderate CKD (GFR = 45-59 mL/min/1 73 square meters)    Stage 3B Moderate CKD (GFR = 30-44 mL/min/1 73 square meters)    Stage 4 Severe CKD (GFR = 15-29 mL/min/1 73 square meters)   Stage 5 End Stage CKD (GFR <15 mL/min/1 73 square meters)  Note: GFR calculation is accurate only with a steady state creatinine    CBC and differential [[id number]]  (Abnormal) Collected:  11/15/19 1949    Lab Status:  Final result Specimen:  Blood from Arm, Right Updated:  11/15/19 2020     WBC 13 49 Thousand/uL      RBC 5 72 Million/uL      Hemoglobin 16 7 g/dL      Hematocrit 50 2 %      MCV 88 fL      MCH 29 2 pg      MCHC 33 3 g/dL      RDW 12 4 %      MPV 8 6 fL      Platelets 980 Thousands/uL      nRBC 0 /100 WBCs      Neutrophils Relative 66 %      Immat GRANS % 0 %      Lymphocytes Relative 26 %      Monocytes Relative 5 %      Eosinophils Relative 2 %      Basophils Relative 1 %      Neutrophils Absolute 8 94 Thousands/µL      Immature Grans Absolute 0 05 Thousand/uL      Lymphocytes Absolute 3 44 Thousands/µL      Monocytes Absolute 0 73 Thousand/µL      Eosinophils Absolute 0 25 Thousand/µL      Basophils Absolute 0 08 Thousands/µL                  CTA ED chest PE Study   Final Result by Ankur Mayfield MD (11/15 2331)      No evidence of pulmonary embolus                  Workstation performed: [id number]         XR chest 2 views   ED Interpretation by Hannah Jordan PA-C (11/15 2022)   No acute cardiopulmonary disease seen by me at this time                 Procedures  ECG 12 Lead Documentation Only  Date/Time: 11/15/2019 7:30 PM  Performed by: Hannah Jordan PA-C  Authorized by: Hannah Jordan PA-C     Indications / Diagnosis:  Chest pain  ECG reviewed by me, the ED Provider: yes    Patient location:  ED  Previous ECG:     Previous ECG:  Unavailable    Comparison to cardiac monitor: Yes    Interpretation:     Interpretation: normal    Rate:     ECG rate:  92    ECG rate assessment: normal    Rhythm:     Rhythm: sinus rhythm    Ectopy:     Ectopy: none    QRS:     QRS axis:  Normal  Conduction:     Conduction: normal    ST segments:     ST segments:  Normal  T waves:     T waves: normal      ECG 12 Lead Documentation Only  Date/Time: 11/15/2019 10:57 PM  Performed by: Lei Be PA-C  Authorized by: Lei Be PA-C     Indications / Diagnosis:  Chest pain 3 hour EKG  ECG reviewed by me, the ED Provider: yes    Patient location:  ED  Previous ECG:     Previous ECG:  Compared to current    Similarity:  No change  Interpretation:     Interpretation: normal    Rate:     ECG rate:  85    ECG rate assessment: normal    Rhythm:     Rhythm: sinus rhythm    Ectopy:     Ectopy: none    QRS:     QRS axis:  Normal  Conduction:     Conduction: normal    ST segments:     ST segments:  Normal  T waves:     T waves: normal             ED Course  ED Course as of Nov 16 0027   Fri Nov 15, 2019   2130 Case discussed with Patrick Lopze, cardiology  Recommends NSAIDs for pain, serial troponins, obtaining sed rate  Feels as though ACS unlikely given presentation/workup, favors pericarditis/myocarditis  2330 CTA chest unremarkable for evidence of PE or large pulmonary effusion   Pt is stable for admission to [facility] Rule for PE      Most Recent Value   PERC Rule for PE   Age >=50  0 Filed at: 11/15/2019 1931   HR >=100  0 Filed at: 11/15/2019 1931   O2 Sat on room air < 95%  0 Filed at: 11/15/2019 1931   History of PE or DVT  0 Filed at: 11/15/2019 1931   Recent trauma or surgery  0 Filed at: 11/15/2019 1931   Hemoptysis  0 Filed at: 11/15/2019 1931   Exogenous estrogen  0 Filed at: 11/15/2019 1931   Unilateral leg swelling  0 Filed at: 11/15/2019 1931   PERC Rule for PE Results  0 Filed at: 11/15/2019 1931                Wells' Criteria for PE      Most Recent Value   Wells' Criteria for PE   Clinical signs and symptoms of DVT  0 Filed at: 11/15/2019 1932   PE is primary diagnosis or equally likely  0 Filed at: 11/15/2019 1932   HR >100  0 Filed at: 11/15/2019 1932   Immobilization at least 3 days or Surgery in the previous 4 weeks  0 Filed at: 11/15/2019 1932   Previous, objectively diagnosed PE or DVT  0 Filed at: 11/15/2019 1932   Hemoptysis  0 Filed at: 11/15/2019 1932   Malignancy with treatment within 6 months or palliative  0 Filed at: 11/15/2019 1932   Wells' Criteria Total  0 Filed at: 11/15/2019 1932            [facility]  Number of Diagnoses or Management Options  Chest pain:   Elevated troponin:   Diagnosis management comments: Sharp, pleuritic midsternal chest pain intermittently for 3 days  Patient with reproducible chest pain on exam   Perc score of 0, wells score of 0  Will obtain basic labs, troponin, EKG, chest x-ray  Will provide headache cocktail  EKG shows normal sinus rhythm  Troponin is elevated at 1 13  Discussed with Cardiology who suspect pericarditis/myocarditis  Recommend serial troponins, check sed rate, no anticoagulation at this time  Will obtain CTA of chest to rule out pulmonary embolism or large pericardial effusion  Will admit patient to General Medicine for further workup and for cardiology consult  Amount and/or Complexity of Data Reviewed  Clinical lab tests: ordered and reviewed  Tests in the radiology section of CPT®: ordered and reviewed    Patient Progress  Patient progress: stable      Disposition  Final diagnoses:   Chest pain   Elevated troponin     Time reflects when diagnosis was documented in both MDM as applicable and the Disposition within this note     Time User Action Codes Description Comment    11/15/2019 10:14 PM Jori Monaco Add [R07 9] Chest pain     11/15/2019 10:14 PM Jori Monaco Add [R79 89] Elevated troponin       ED Disposition     ED Disposition Condition Date/Time Comment    Admit Stable Fri Nov 15, 2019 10:13 PM Case was discussed with Krystyna Archuleta and the patient's admission status was agreed to be Admission Status: observation status to the service of Dr Jamie Washington          Follow-up Information    None         Patient's Medications   Discharge Prescriptions    No medications on file     No discharge procedures on file      ED Provider  Electronically Signed by           Cherry Lemon PA-C  11/16/19 5131

## 2019-11-16 NOTE — CONSULTS
Consult - Cardiology   Konstantin Ziegler 30 y o  male MRN: [id number]  Unit/Bed#: E4 -01 Encounter: [id number]        Reason For Consult:  Chest pain/elevated troponin                 ASSESSMENT:  1  Chest pain/ elevated troponin   -troponin 1 13 --> 1 6 --> 1 53   -serial EKGs personally reviewed, no STEMI, no ischemic changes   -CXR w/o acute disease by my read   -CTA, no PE    2  Tobacco abuse  3  Asthma    PLAN:     1  His symptoms are likely secondary to acute pericarditis  2  Begin Motrin 400 mg t i d   3  Begin Protonix 40 mg daily for GI protection  4  No need for further troponins  5  Outpatient echocardiogram  6  Patient's pain is already relieved (with Toradol) and he is feeling better  Will arrange follow-up in our office in about 1 month  7  Continue Motrin and Protonix for least 2 weeks     History Of Present Illness: This is a very kind 15-year-old gentleman with no known past medical history  He has no problems with his heart that he knows of and has never seen a cardiologist   He does not regularly see physicians and does not have a primary care doctor  He does have a family history of significant CAD in both of his parents who had MIs in her late 35s  He currently does smoke cigarettes  He has a history of asthma  At his baseline he has a good exercise tolerance and tells me he could easily walk up a flight of stairs and walk 2-3 blocks without getting any chest pain or significant shortness of breath  This gentleman is presently admitted due to chest pain  This is sharp in nature  It begins substernally and radiates to the left  Has been intermittent for the last 3 days  It lasts 5-10 minutes at a time  Has no palliative or provocative factors and seems to have a mind of its own  It is occurring at least once every hour period  Yesterday due to the persistence of his symptoms he decided to come to the emergency department for evaluation    Serial troponins were drawn which peaked at 1 5  He was given Toradol with almost complete resolution of his pain  Since arrival he has had 1 episode of minor chest pain but nothing close to the level of what he was feeling last 3 days  Past Medical History:        Past Medical History:   Diagnosis Date    Asthma     Migraine     child    History reviewed  No pertinent surgical history  Allergy:        No Known Allergies    Medications:       Prior to Admission medications    Medication Sig Start Date End Date Taking? Authorizing Provider   lidocaine (LIDODERM) 5 % Apply 1 patch topically daily Remove & Discard patch within 12 hours or as directed by MD  Patient not taking: Reported on 11/16/2019 1/30/19   Ethelene Boast, PA-C   naproxen (NAPROSYN) 500 mg tablet Take 1 tablet (500 mg total) by mouth 2 (two) times a day with meals  Patient not taking: Reported on 11/16/2019 1/10/19   Lucio Armenta DO       Family History:     History reviewed  No pertinent family history       Social History:       Social History     Socioeconomic History    Marital status: Single     Spouse name: None    Number of children: None    Years of education: None    Highest education level: None   Occupational History    None   Social Needs    Financial resource strain: None    Food insecurity:     Worry: None     Inability: None    Transportation needs:     Medical: None     Non-medical: None   Tobacco Use    Smoking status: Current Every Day Smoker     Packs/day: 0 50     Years: 11 00     Pack years: 5 50     Types: Cigarettes    Smokeless tobacco: Never Used   Substance and Sexual Activity    Alcohol use: Yes     Frequency: Monthly or less     Drinks per session: 1 or 2     Binge frequency: Never    Drug use: Yes     Frequency: 7 0 times per week     Types: Marijuana     Comment: to help sleep    Sexual activity: None   Lifestyle    Physical activity:     Days per week: None     Minutes per session: None    Stress: None   Relationships  Social connections:     Talks on phone: None     Gets together: None     Attends Zoroastrianism service: None     Active member of club or organization: None     Attends meetings of clubs or organizations: None     Relationship status: None    Intimate partner violence:     Fear of current or ex partner: None     Emotionally abused: None     Physically abused: None     Forced sexual activity: None   Other Topics Concern    None   Social History Narrative    None       ROS:  Symptoms per HPI  Remainder review of systems is negative    Exam:  General:  alert, oriented and in no distress, cooperative  Head: Normocephalic, atraumatic  Eyes:  EOMI  Pupils - equal, round, reactive to accomodation  No icterus  Normal Conjunctiva  Oropharynx: moist and normal-appearing mucosa  Neck: supple, symmetrical, trachea midline and no JVD  Heart:  RRR, No: murmer, rub or gallop, S1 & S2 normal    Chest wall nontender to palpation  Respiratory effort / Chest Inspection: unlabored  Lungs:  normal air entry, lungs clear to auscultation and no rales, rhonchi or wheezing   Abdomen: flat, normal findings: bowel sounds normal and soft, non-tender  Lower Limbs:  no pitting edema  Pulses[de-identified]  RLE - DP: present 2+                 LLE - DP: present 2+  Musculoskeletal: ROM grossly normal        DATA:      ECG:                 Normal sinus rhythm  Normal axis  Nonspecific T-wave changes  No ST segment changes      Telemetry: Normal sinus rhythm,   HR 80-90                Weights: Wt Readings from Last 3 Encounters:   11/16/19 103 kg (228 lb)   01/30/19 116 kg (255 lb)   01/10/19 99 9 kg (220 lb 3 8 oz)   , Body mass index is 38 18 kg/m²           Lab Studies:    Results from last 7 days   Lab Units 11/16/19  0510 11/16/19  0156 11/15/19  2248   TROPONIN I ng/mL 1 53* 1 53* 1 60*          Results from last 7 days   Lab Units 11/16/19  0510 11/15/19  1949   WBC Thousand/uL 12 05* 13 49*   HEMOGLOBIN g/dL 15 4 16 7   HEMATOCRIT % 46 4 50 2* PLATELETS Thousands/uL 411* 469*   ,   Results from last 7 days   Lab Units 11/16/19  0510 11/15/19  1949   POTASSIUM mmol/L 3 9 3 5   CHLORIDE mmol/L 104 98*   CO2 mmol/L 27 28   BUN mg/dL 14 11   CREATININE mg/dL 0 87 0 85   CALCIUM mg/dL 9 3 9 9

## 2019-11-16 NOTE — UTILIZATION REVIEW
Initial Clinical Review    Admission: Date/Time/Statement: BASIM [email or web address]    Orders Placed This Encounter   Procedures    Place in Observation (expected length of stay for this patient is less than two midnights)     Standing Status:   Standing     Number of Occurrences:   1     Order Specific Question:   Admitting Physician     Answer:   Jaguar Anguiano [[id number]]     Order Specific Question:   Level of Care     Answer:   Med Surg [[id number]]     ED Arrival Information     Expected Arrival Acuity Means of Arrival Escorted By Service Admission Type    - 11/15/2019 18:13 Urgent Walk-In Family Member General Medicine Urgent    Arrival Complaint    Trouble Breathing        Chief Complaint   Patient presents with    Chest Pain     Patient reporting CP for the past 3 days intermittently  Denies any recent cough  Reports pain is worse when he takes a deep breath        presents with chest pain  Intermittent for 3 days  Pain lasts 5-10 minutes then resolves  It is not activity related  Pain is sharp and stabbing  Pain is on the left side of his chest and radiates into the left neck and up into his head  No cough  Assessment/Plan: 31 yo male to ED from home admitted observation d/t  Elevated troponin  Assessment & Plan  Admit to med/surg on telemetry  EKG normal  Initial troponin 1 13, repeat 1 6  ED spoke with cardiology  Will order SED rate and NSAIDS for possible pericarditis  Consider ECHO  Consult cardiology  Trend troponin     Tobacco abuse  Assessment & Plan  Order nicotine patch     Asthma  Assessment & Plan  Order albuterol PRN    11/16 Cardiology consult:Chest pain/ elevated troponin, troponin 1 13 --> 1 6 --> 1 53, serial EKGs personally reviewed, no STEMI, no ischemic changes  CXR w/o acute disease  CTA, no PE likely secondary to acute pericarditis  Motrin, protonix, outpt echo, pain is already relieved (with Toradol)  F/u outpt 1 month  Continue Motrin and Protonix for least 2 weeks        ED Triage Vitals Temperature Pulse Respirations Blood Pressure SpO2   11/16/19 0037 11/15/19 1821 11/15/19 1821 11/15/19 1821 11/15/19 1821   98 °F (36 7 °C) 98 16 130/88 100 %      Temp Source Heart Rate Source Patient Position - Orthostatic VS BP Location FiO2 (%)   11/16/19 0037 11/15/19 1821 11/15/19 1821 11/15/19 1821 --   Temporal Monitor Sitting Right arm       Pain Score       11/15/19 1821       Worst Possible Pain        Wt Readings from Last 1 Encounters:   11/16/19 103 kg (228 lb)     Additional Vital Signs:   11/16/19 0711  98 2 °F (36 8 °C)  76  20  114/66  78  97 %  None (Room air)  Sitting   11/16/19 0037  98 °F (36 7 °C)  93  17  127/80    97 %  None (Room air)  Sitting   11/15/19 2335    91  18  124/80    99 %  None (Room air)  Sitting   11/15/19 2127    78  16  118/62    100 %  None (Room air)  Lying   11/15/19 2014    93  18  135/65    98 %  None (Room air)  Lying   11/15/19 1821    98  16  130/88    100 %  None (Room air)  Sitting       Pertinent Labs/Diagnostic Test Results:   Results from last 7 days   Lab Units 11/16/19  0510 11/15/19  1949   WBC Thousand/uL 12 05* 13 49*   HEMOGLOBIN g/dL 15 4 16 7   HEMATOCRIT % 46 4 50 2*   PLATELETS Thousands/uL 411* 469*   NEUTROS ABS Thousands/µL  --  8 94*         Results from last 7 days   Lab Units 11/16/19  0510 11/15/19  1949   SODIUM mmol/L 141 137   POTASSIUM mmol/L 3 9 3 5   CHLORIDE mmol/L 104 98*   CO2 mmol/L 27 28   ANION GAP mmol/L 10 11   BUN mg/dL 14 11   CREATININE mg/dL 0 87 0 85   EGFR ml/min/1 73sq m 117 119   CALCIUM mg/dL 9 3 9 9             Results from last 7 days   Lab Units 11/16/19  0510 11/15/19  1949   GLUCOSE RANDOM mg/dL 124 110       Results from last 7 days   Lab Units 11/16/19  0510 11/16/19  0156 11/15/19  2248 11/15/19  1949   TROPONIN I ng/mL 1 53* 1 53* 1 60* 1 13*       Results from last 7 days   Lab Units 11/15/19  1949   SED RATE mm/hour 2     11/15 CXR:PENDING    11/15 CTA CHEST:No evidence of pulmonary embolus    ECG 12 Lead Documentation Only  Date/Time: 11/15/2019 7:30 PM  Performed by: David Roberts PA-C  Authorized by: David Roberts PA-C      Indications / Diagnosis:  Chest pain  ECG reviewed by me, the ED Provider: yes    Patient location:  ED  Previous ECG:     Previous ECG:  Unavailable    Comparison to cardiac monitor: Yes    Interpretation:     Interpretation: normal    Rate:     ECG rate:  92    ECG rate assessment: normal    Rhythm:     Rhythm: sinus rhythm    Ectopy:     Ectopy: none    QRS:     QRS axis:  Normal  Conduction:     Conduction: normal    ST segments:     ST segments:  Normal  T waves:     T waves: normal       ECG 12 Lead Documentation Only  Date/Time: 11/15/2019 10:57 PM  Performed by: David Roberts PA-C  Authorized by: David Roberts PA-C      Indications / Diagnosis:  Chest pain 3 hour EKG  ECG reviewed by me, the ED Provider: yes    Patient location:  ED  Previous ECG:     Previous ECG:  Compared to current    Similarity:  No change  Interpretation:     Interpretation: normal    Rate:     ECG rate:  85    ECG rate assessment: normal    Rhythm:     Rhythm: sinus rhythm    Ectopy:     Ectopy: none    QRS:     QRS axis:  Normal  Conduction:     Conduction: normal    ST segments:     ST segments:  Normal  T waves:     T waves: normal              11/15 EKG    ED Treatment:   Medication Administration from 11/15/2019 1812 to 11/16/2019 0032       Date/Time Order Dose Route Action                  11/15/2019 1951 sodium chloride 0 9 % bolus 1,000 mL 1,000 mL Intravenous New Bag       11/15/2019 1952 ketorolac (TORADOL) injection 15 mg 15 mg Intravenous Given       11/15/2019 1951 metoclopramide (REGLAN) injection 10 mg 10 mg Intravenous Given       11/15/2019 1951 diphenhydrAMINE (BENADRYL) injection 25 mg 25 mg Intravenous Given       11/15/2019 2118 aspirin tablet 325 mg 325 mg Oral Given       11/15/2019 2318 iohexol (OMNIPAQUE) 350 MG/ML injection (MULTI-DOSE) 85 mL 85 mL Intravenous Given          Past Medical History:   Diagnosis Date    Asthma     Migraine     child       Admitting Diagnosis: Shortness of breath [R06 02]  Chest pain [R07 9]  Elevated troponin [R79 89]  Age/Sex: 30 y o  male  Admission Orders:  Scheduled Medications:    Medications:  ibuprofen 400 mg Oral TID   nicotine 1 patch Transdermal Daily   pantoprazole 40 mg Oral Early Morning     Continuous IV Infusions:     PRN Meds:    acetaminophen 650 mg Oral Q6H PRN   albuterol 2 puff Inhalation Q4H PRN   calcium carbonate 1,000 mg Oral Daily PRN   ketorolac 30 mg Intravenous Q6H PRN   ondansetron 4 mg Intravenous Q6H PRN     TELE  REG DIET  OOB  IP CONSULT TO CARDIOLOGY    Network Utilization Review Department  [email or web address]  ATTENTION: Please call with any questions or concerns to [phone] and carefully listen to the prompts so that you are directed to the right person  All voicemails are confidential   Cristian De Souza all requests for admission clinical reviews, approved or denied determinations and any other requests to dedicated fax number below belonging to the campus where the patient is receiving treatment    FACILITY NAME UR FAX NUMBER   ADMISSION DENIALS (Administrative/Medical Necessity) [address] [facility] (Maternity/NICU/Pediatrics) [phone]   [facility] [address] [facility] [address] [facility] [phone]   [address] [place] [address] [address] [facility] [phone]   [facility] [address] [phone]

## 2019-11-16 NOTE — PLAN OF CARE
Problem: CARDIOVASCULAR - ADULT  Goal: Maintains optimal cardiac output and hemodynamic stability  Description  INTERVENTIONS:  - Monitor I/O, vital signs and rhythm  - Monitor for S/S and trends of decreased cardiac output  - Administer and titrate ordered vasoactive medications to optimize hemodynamic stability  - Assess quality of pulses, skin color and temperature  - Assess for signs of decreased coronary artery perfusion  - Instruct patient to report change in severity of symptoms  Outcome: Progressing     Problem: HEMATOLOGIC - ADULT  Goal: Maintains hematologic stability  Description  INTERVENTIONS  - Assess for signs and symptoms of bleeding or hemorrhage  - Monitor labs  - Administer supportive blood products/factors as ordered and appropriate  Outcome: Progressing     Problem: PAIN - ADULT  Goal: Verbalizes/displays adequate comfort level or baseline comfort level  Description  Interventions:  - Encourage patient to monitor pain and request assistance  - Assess pain using appropriate pain scale  - Administer analgesics based on type and severity of pain and evaluate response  - Implement non-pharmacological measures as appropriate and evaluate response  - Consider cultural and social influences on pain and pain management  - Notify physician/advanced practitioner if interventions unsuccessful or patient reports new pain  Outcome: Progressing     Problem: DISCHARGE PLANNING  Goal: Discharge to home or other facility with appropriate resources  Description  INTERVENTIONS:  - Identify barriers to discharge w/patient and caregiver  - Arrange for needed discharge resources and transportation as appropriate  - Identify discharge learning needs (meds, wound care, etc )  - Arrange for interpretive services to assist at discharge as needed  - Refer to Case Management Department for coordinating discharge planning if the patient needs post-hospital services based on physician/advanced practitioner order or complex needs related to functional status, cognitive ability, or social support system  Outcome: Progressing     Problem: Knowledge Deficit  Goal: Patient/family/caregiver demonstrates understanding of disease process, treatment plan, medications, and discharge instructions  Description  Complete learning assessment and assess knowledge base    Interventions:  - Provide teaching at level of understanding  - Provide teaching via preferred learning methods  Outcome: Progressing

## 2019-11-16 NOTE — DISCHARGE SUMMARY
Discharge- Lien James 1990, 30 y o  male MRN: [id number]    Unit/Bed#: E4 -01 Encounter: [id number]    Primary Care Provider: Lu Alston PA-C   Date and time admitted to hospital: 11/15/2019  7:20 PM        * Acute idiopathic pericarditis  Assessment & Plan  21-year-old male coming in with chest pain and elevated troponin suspected to be due to acute idiopathic pericarditis  - discharge patient with naproxen 500 mg twice a day for 10 days and colchicine 0 6 mg for 30 days as per Cardiology recommendation  - ordered pantoprazole 20 mg daily for 10 days while he is on naproxen  Tobacco abuse  Assessment & Plan  Counseled patient regarding smoking cessation    Asthma  Assessment & Plan  No evidence of an acute exacerbation    Elevated troponin  Assessment & Plan  Echo results reviewed with cardiology  No evidence of ischemia  Recent Labs     11/15/19  2248 11/16/19  0156 11/16/19  0510   TROPONINI 1 60* 1 53* 1 53*           Discharging Physician / Practitioner: Arizona Dakins, MD  PCP: Lu Alston PA-C  Admission Date:   Admission Orders (From admission, onward)     Ordered        11/15/19 2356  Place in Observation (expected length of stay for this patient is less than two midnights)  Once                   Discharge Date: 11/16/19    Resolved Problems  Date Reviewed: 11/16/2019    None          Consultations During Hospital Stay:  · Cardiology    Procedures Performed:   · Echo    Significant Findings / Test Results:   · CTA Chest:    PULMONARY ARTERIAL TREE:  No pulmonary embolus is seen       LUNGS:  Lungs are clear    There is no tracheal or endobronchial lesion      PLEURA:  Unremarkable      HEART/GREAT VESSELS:  Unremarkable for patient's age      MEDIASTINUM AND RENEA:  Unremarkable      CHEST WALL AND LOWER NECK:   Unremarkable      VISUALIZED STRUCTURES IN THE UPPER ABDOMEN:  Visualized hepatic parenchyma is diffusely decreased in density consistent with hepatic steatosis  Otherwise no clinical significant abnormality identified in the visualized upper abdomen      OSSEOUS STRUCTURES:  No acute fracture or destructive osseous lesion      IMPRESSION:     No evidence of pulmonary embolus    · XR Chest    FINDINGS:     Cardiomediastinal silhouette appears unremarkable      The lungs are clear  No pneumothorax or pleural effusion      Osseous structures appear within normal limits for patient age      IMPRESSION:     No acute cardiopulmonary disease  · Echo    LEFT VENTRICLE:  Systolic function was normal  Ejection fraction was estimated to be 65 %  There were no regional wall motion abnormalities      MITRAL VALVE:  There was trace regurgitation      TRICUSPID VALVE:  There was trace regurgitation      PULMONIC VALVE:  There was trace regurgitation      HISTORY: Symptoms: chest pain  PRIOR HISTORY: Elevated Troponin Level;     PROCEDURE: The procedure was performed at the bedside  This was a stat study  The transthoracic approach was used  The study included complete 2D imaging, M-mode, complete spectral Doppler, and color Doppler  The heart rate was 74 bpm, at  the start of the study  Images were obtained from the parasternal, apical, subcostal, and suprasternal notch acoustic windows  Echocardiographic views were limited due to poor acoustic window availability, decreased penetration, and lung  interference  This was a technically difficult study      LEFT VENTRICLE: Size was normal  Systolic function was normal  Ejection fraction was estimated to be 65 %  There were no regional wall motion abnormalities  Wall thickness was normal  DOPPLER: The transmitral flow pattern was normal      RIGHT VENTRICLE: The size was normal  Systolic function was normal  Wall thickness was normal      LEFT ATRIUM: Size was normal      RIGHT ATRIUM: Size was normal      MITRAL VALVE: Valve structure was normal  There was normal leaflet separation   DOPPLER: The transmitral velocity was within the normal range  There was no evidence for stenosis  There was trace regurgitation      AORTIC VALVE: The valve was trileaflet  Leaflets exhibited normal thickness and normal cuspal separation  DOPPLER: Transaortic velocity was within the normal range  There was no evidence for stenosis  There was no regurgitation      TRICUSPID VALVE: The valve structure was normal  There was normal leaflet separation  DOPPLER: The transtricuspid velocity was within the normal range  There was no evidence for stenosis  There was trace regurgitation  Pulmonary artery  systolic pressure was within the normal range  Estimated peak PA pressure was 22 mmHg      PULMONIC VALVE: Leaflets exhibited normal thickness, no calcification, and normal cuspal separation  DOPPLER: The transpulmonic velocity was within the normal range  There was trace regurgitation      PERICARDIUM: There was no pericardial effusion  The pericardium was normal in appearance      AORTA: The root exhibited normal size      SYSTEMIC VEINS: IVC: The inferior vena cava was normal in size and course  Respirophasic changes were normal     · Troponin    Recent Labs     11/15/19  2248 11/16/19  0156 11/16/19  0510   TROPONINI 1 60* 1 53* 1 53*     Incidental Findings:   · As written above    Test Results Pending at Discharge (will require follow up): · None     Outpatient Tests Requested:  · bmp    Complications:  None    Reason for Admission: Chest pain    Hospital Course:     Colton Gonzalez is a 30 y o  male patient who originally presented to the hospital on 11/15/2019 due to Chest pain    27-year-old gentleman with a past medical history of asthma and a significant family history of MI admitted due to chest pain  Description of the chest pain is more pleuritic in nature and sharp not associated with exertion and not relieved with rest   His chest pain improved significantly after he received a dose of ketorolac in the ED    EKG was reviewed not showing any evidence of ST changes nor findings suggestive of acute pericarditis  However he had significant troponin elevation and some white count  ESR and CRP within normal limits  Chest x-ray with no acute cardiopulmonary disease  CTA negative for pulmonary embolism  Since receiving his Toradol injection, he has not have any recurrence of his chest pain  Echo was performed with results listed above  No evidence of wall motion abnormalities or ischemia to warrant cardiac catheterization as per Cardiology  Discussed case extensively with Dr Ranjeet Stroud who recommended to continue him on naproxen 500 mg twice a day with PPI prophylaxis for 10 days and to complete a 30 day course of colchicine for the management of acute pericarditis  He stated that no further intervention is needed and may follow up on an outpatient basis with Cardiology  He will be contacted by his office within the next 30 days  Lipid profile performed showing a significantly elevated triglycerides to 470, but LDL could not be calculated as a result of these elevated levels  Cardiology recommended to defer statin therapy for now and they will reconsider it once they see him on an outpatient basis after repeat blood work is performed  I recommended that he take his medications as instructed and to set up an outpatient follow-up appointment with his primary care provider  Also requested that he seek immediate medical attention should he develop recurrence of chest pain, shortness of breath, or any other concerning symptoms  Please see above list of diagnoses and related plan for additional information  Condition at Discharge: fair     Discharge Day Visit / Exam:     Subjective:  Patient currently chest pain-free and denies any complaints    Vitals: Blood Pressure: 127/70 (11/16/19 1500)  Pulse: 84 (11/16/19 1500)  Temperature: 98 3 °F (36 8 °C) (11/16/19 1500)  Temp Source: Temporal (11/16/19 1500)  Respirations: 20 (11/16/19 4206)  Height: 5' 4 8" (164 6 cm) (11/16/19 0037)  Weight - Scale: 103 kg (228 lb) (11/16/19 0037)  SpO2: 98 % (11/16/19 1500)  Exam:   Physical Exam   Constitutional: He appears well-developed and well-nourished  HENT:   Head: Normocephalic and atraumatic  Eyes: Pupils are equal, round, and reactive to light  EOM are normal    Neck: Normal range of motion  Neck supple  Cardiovascular: Normal rate, regular rhythm, normal heart sounds and intact distal pulses  Exam reveals no gallop and no friction rub  No murmur heard  Pulmonary/Chest: Effort normal and breath sounds normal  No stridor  No respiratory distress  He has no wheezes  He has no rales  He exhibits no tenderness  Abdominal: Soft  Bowel sounds are normal    Musculoskeletal: Normal range of motion  Neurological: He is alert  Skin: Skin is warm and dry  Psychiatric: He has a normal mood and affect  Discussion with Family: Wife and patient    Discharge instructions/Information to patient and family:   See after visit summary for information provided to patient and family  Provisions for Follow-Up Care:  See after visit summary for information related to follow-up care and any pertinent home health orders  Disposition:     Home    For Discharges to [address] [facility]:   · Not Applicable to this Patient - Not Applicable to this Patient    Planned Readmission: None      Discharge Statement:  I spent 37 minutes discharging the patient  This time was spent on the day of discharge  I had direct contact with the patient on the day of discharge  Greater than 50% of the total time was spent examining patient, answering all patient questions, arranging and discussing plan of care with patient as well as directly providing post-discharge instructions  Additional time then spent on discharge activities  Discharge Medications:  See after visit summary for reconciled discharge medications provided to patient and family        ** Please Note: This note has been constructed using a voice recognition system **

## 2020-01-01 ENCOUNTER — HOSPITAL ENCOUNTER (EMERGENCY)
Facility: HOSPITAL | Age: 30
End: 2020-01-02
Attending: EMERGENCY MEDICINE
Payer: COMMERCIAL

## 2020-01-01 VITALS — DIASTOLIC BLOOD PRESSURE: 68 MMHG | SYSTOLIC BLOOD PRESSURE: 137 MMHG | OXYGEN SATURATION: 100 %

## 2020-01-01 DIAGNOSIS — R07.9 CHEST PAIN: Primary | ICD-10-CM

## 2020-01-01 DIAGNOSIS — I49.01 VENTRICULAR FIBRILLATION (HCC): ICD-10-CM

## 2020-01-01 DIAGNOSIS — R77.8 ELEVATED TROPONIN: ICD-10-CM

## 2020-01-01 DIAGNOSIS — I46.9 CARDIAC ARREST (HCC): ICD-10-CM

## 2020-01-01 LAB
ALBUMIN SERPL BCP-MCNC: 4.4 G/DL (ref 3.5–5)
ALP SERPL-CCNC: 131 U/L (ref 46–116)
ALT SERPL W P-5'-P-CCNC: 98 U/L (ref 12–78)
ANION GAP BLD CALC-SCNC: 13 MMOL/L (ref 4–13)
ANION GAP SERPL CALCULATED.3IONS-SCNC: 16 MMOL/L (ref 4–13)
APAP SERPL-MCNC: <2 UG/ML (ref 10–20)
AST SERPL W P-5'-P-CCNC: 36 U/L (ref 5–45)
ATRIAL RATE: 100 BPM
ATRIAL RATE: 127 BPM
BASOPHILS # BLD AUTO: 0.11 THOUSANDS/ΜL (ref 0–0.1)
BASOPHILS NFR BLD AUTO: 1 % (ref 0–1)
BILIRUB SERPL-MCNC: 0.38 MG/DL (ref 0.2–1)
BUN BLD-MCNC: 15 MG/DL (ref 5–25)
BUN SERPL-MCNC: 13 MG/DL (ref 5–25)
CA-I BLD-SCNC: 1.08 MMOL/L (ref 1.12–1.32)
CALCIUM SERPL-MCNC: 9.9 MG/DL (ref 8.3–10.1)
CHLORIDE BLD-SCNC: 103 MMOL/L (ref 100–108)
CHLORIDE SERPL-SCNC: 100 MMOL/L (ref 100–108)
CO2 SERPL-SCNC: 26 MMOL/L (ref 21–32)
CREAT BLD-MCNC: 0.9 MG/DL (ref 0.6–1.3)
CREAT SERPL-MCNC: 0.99 MG/DL (ref 0.6–1.3)
EOSINOPHIL # BLD AUTO: 0.46 THOUSAND/ΜL (ref 0–0.61)
EOSINOPHIL NFR BLD AUTO: 3 % (ref 0–6)
ERYTHROCYTE [DISTWIDTH] IN BLOOD BY AUTOMATED COUNT: 12.6 % (ref 11.6–15.1)
ETHANOL SERPL-MCNC: 5 MG/DL (ref 0–3)
GFR SERPL CREATININE-BSD FRML MDRD: 103 ML/MIN/1.73SQ M
GFR SERPL CREATININE-BSD FRML MDRD: 116 ML/MIN/1.73SQ M
GLUCOSE SERPL-MCNC: 133 MG/DL (ref 65–140)
GLUCOSE SERPL-MCNC: 137 MG/DL (ref 65–140)
HCT VFR BLD AUTO: 47.4 % (ref 36.5–49.3)
HCT VFR BLD CALC: 48 % (ref 36.5–49.3)
HGB BLD-MCNC: 15.9 G/DL (ref 12–17)
HGB BLDA-MCNC: 16.3 G/DL (ref 12–17)
IMM GRANULOCYTES # BLD AUTO: 0.07 THOUSAND/UL (ref 0–0.2)
IMM GRANULOCYTES NFR BLD AUTO: 0 % (ref 0–2)
LYMPHOCYTES # BLD AUTO: 4.7 THOUSANDS/ΜL (ref 0.6–4.47)
LYMPHOCYTES NFR BLD AUTO: 26 % (ref 14–44)
MAGNESIUM SERPL-MCNC: 2 MG/DL (ref 1.6–2.6)
MCH RBC QN AUTO: 29.4 PG (ref 26.8–34.3)
MCHC RBC AUTO-ENTMCNC: 33.5 G/DL (ref 31.4–37.4)
MCV RBC AUTO: 88 FL (ref 82–98)
MONOCYTES # BLD AUTO: 1.01 THOUSAND/ΜL (ref 0.17–1.22)
MONOCYTES NFR BLD AUTO: 6 % (ref 4–12)
NEUTROPHILS # BLD AUTO: 12.07 THOUSANDS/ΜL (ref 1.85–7.62)
NEUTS SEG NFR BLD AUTO: 64 % (ref 43–75)
NRBC BLD AUTO-RTO: 0 /100 WBCS
NT-PROBNP SERPL-MCNC: 34 PG/ML
P AXIS: 70 DEGREES
P AXIS: 99 DEGREES
PCO2 BLD: 26 MMOL/L (ref 21–32)
PLATELET # BLD AUTO: 481 THOUSANDS/UL (ref 149–390)
PMV BLD AUTO: 8.9 FL (ref 8.9–12.7)
POTASSIUM BLD-SCNC: 3.8 MMOL/L (ref 3.5–5.3)
POTASSIUM SERPL-SCNC: 3.9 MMOL/L (ref 3.5–5.3)
PR INTERVAL: 128 MS
PR INTERVAL: 142 MS
PROT SERPL-MCNC: 8 G/DL (ref 6.4–8.2)
QRS AXIS: -33 DEGREES
QRS AXIS: 19 DEGREES
QRSD INTERVAL: 86 MS
QRSD INTERVAL: 88 MS
QT INTERVAL: 286 MS
QT INTERVAL: 338 MS
QTC INTERVAL: 415 MS
QTC INTERVAL: 436 MS
RBC # BLD AUTO: 5.41 MILLION/UL (ref 3.88–5.62)
SALICYLATES SERPL-MCNC: <3 MG/DL (ref 3–20)
SODIUM BLD-SCNC: 138 MMOL/L (ref 136–145)
SODIUM SERPL-SCNC: 142 MMOL/L (ref 136–145)
SPECIMEN SOURCE: ABNORMAL
T WAVE AXIS: -12 DEGREES
T WAVE AXIS: 83 DEGREES
TROPONIN I SERPL-MCNC: 0.18 NG/ML
TSH SERPL DL<=0.05 MIU/L-ACNC: 1.12 UIU/ML (ref 0.36–3.74)
VENTRICULAR RATE: 100 BPM
VENTRICULAR RATE: 127 BPM
WBC # BLD AUTO: 18.42 THOUSAND/UL (ref 4.31–10.16)

## 2020-01-01 PROCEDURE — 80047 BASIC METABLC PNL IONIZED CA: CPT

## 2020-01-01 PROCEDURE — 36415 COLL VENOUS BLD VENIPUNCTURE: CPT | Performed by: EMERGENCY MEDICINE

## 2020-01-01 PROCEDURE — 93005 ELECTROCARDIOGRAM TRACING: CPT

## 2020-01-01 PROCEDURE — 99291 CRITICAL CARE FIRST HOUR: CPT | Performed by: EMERGENCY MEDICINE

## 2020-01-01 PROCEDURE — 85025 COMPLETE CBC W/AUTO DIFF WBC: CPT | Performed by: EMERGENCY MEDICINE

## 2020-01-01 PROCEDURE — 80329 ANALGESICS NON-OPIOID 1 OR 2: CPT | Performed by: EMERGENCY MEDICINE

## 2020-01-01 PROCEDURE — 92950 HEART/LUNG RESUSCITATION CPR: CPT

## 2020-01-01 PROCEDURE — 83735 ASSAY OF MAGNESIUM: CPT | Performed by: EMERGENCY MEDICINE

## 2020-01-01 PROCEDURE — 99291 CRITICAL CARE FIRST HOUR: CPT

## 2020-01-01 PROCEDURE — 93010 ELECTROCARDIOGRAM REPORT: CPT | Performed by: INTERNAL MEDICINE

## 2020-01-01 PROCEDURE — 85014 HEMATOCRIT: CPT

## 2020-01-01 PROCEDURE — 92950 HEART/LUNG RESUSCITATION CPR: CPT | Performed by: EMERGENCY MEDICINE

## 2020-01-01 PROCEDURE — 76604 US EXAM CHEST: CPT | Performed by: EMERGENCY MEDICINE

## 2020-01-01 PROCEDURE — 80320 DRUG SCREEN QUANTALCOHOLS: CPT | Performed by: EMERGENCY MEDICINE

## 2020-01-01 PROCEDURE — 84484 ASSAY OF TROPONIN QUANT: CPT | Performed by: EMERGENCY MEDICINE

## 2020-01-01 PROCEDURE — 83880 ASSAY OF NATRIURETIC PEPTIDE: CPT | Performed by: EMERGENCY MEDICINE

## 2020-01-01 PROCEDURE — 84443 ASSAY THYROID STIM HORMONE: CPT | Performed by: EMERGENCY MEDICINE

## 2020-01-01 PROCEDURE — 80053 COMPREHEN METABOLIC PANEL: CPT | Performed by: EMERGENCY MEDICINE

## 2020-01-01 RX ORDER — MAGNESIUM SULFATE 500 MG/ML
VIAL (ML) INJECTION CODE/TRAUMA/SEDATION MEDICATION
Status: COMPLETED | OUTPATIENT
Start: 2020-01-01 | End: 2020-01-01

## 2020-01-01 RX ORDER — AMIODARONE HYDROCHLORIDE 50 MG/ML
INJECTION, SOLUTION INTRAVENOUS CODE/TRAUMA/SEDATION MEDICATION
Status: COMPLETED | OUTPATIENT
Start: 2020-01-01 | End: 2020-01-01

## 2020-01-01 RX ORDER — LIDOCAINE HYDROCHLORIDE 20 MG/ML
INJECTION, SOLUTION EPIDURAL; INFILTRATION; INTRACAUDAL; PERINEURAL CODE/TRAUMA/SEDATION MEDICATION
Status: COMPLETED | OUTPATIENT
Start: 2020-01-01 | End: 2020-01-01

## 2020-01-01 RX ORDER — AMIODARONE HYDROCHLORIDE 50 MG/ML
INJECTION, SOLUTION INTRAVENOUS
Status: DISCONTINUED
Start: 2020-01-01 | End: 2020-01-01 | Stop reason: WASHOUT

## 2020-01-01 RX ADMIN — EPINEPHRINE 1 MG: 0.1 INJECTION, SOLUTION ENDOTRACHEAL; INTRACARDIAC; INTRAVENOUS at 03:53

## 2020-01-01 RX ADMIN — EPINEPHRINE 1 MG: 0.1 INJECTION, SOLUTION ENDOTRACHEAL; INTRACARDIAC; INTRAVENOUS at 03:36

## 2020-01-01 RX ADMIN — EPINEPHRINE 1 MG: 0.1 INJECTION, SOLUTION ENDOTRACHEAL; INTRACARDIAC; INTRAVENOUS at 03:25

## 2020-01-01 RX ADMIN — LIDOCAINE HYDROCHLORIDE 50 MG: 20 INJECTION, SOLUTION EPIDURAL; INFILTRATION; INTRACAUDAL; PERINEURAL at 03:41

## 2020-01-01 RX ADMIN — EPINEPHRINE 1 MG: 0.1 INJECTION, SOLUTION ENDOTRACHEAL; INTRACARDIAC; INTRAVENOUS at 03:51

## 2020-01-01 RX ADMIN — LIDOCAINE HYDROCHLORIDE 100 MG: 20 INJECTION, SOLUTION EPIDURAL; INFILTRATION; INTRACAUDAL; PERINEURAL at 03:28

## 2020-01-01 RX ADMIN — AMIODARONE HYDROCHLORIDE 1 MG/MIN: 50 INJECTION, SOLUTION INTRAVENOUS at 03:45

## 2020-01-01 RX ADMIN — MAGNESIUM SULFATE HEPTAHYDRATE 2 G: 500 INJECTION, SOLUTION INTRAMUSCULAR; INTRAVENOUS at 03:43

## 2020-01-01 RX ADMIN — EPINEPHRINE 1 MG: 0.1 INJECTION, SOLUTION ENDOTRACHEAL; INTRACARDIAC; INTRAVENOUS at 03:58

## 2020-01-01 RX ADMIN — AMIODARONE HYDROCHLORIDE 300 MG: 50 INJECTION, SOLUTION INTRAVENOUS at 03:33

## 2020-01-01 RX ADMIN — MAGNESIUM SULFATE HEPTAHYDRATE 2 G: 500 INJECTION, SOLUTION INTRAMUSCULAR; INTRAVENOUS at 03:32

## 2020-01-01 RX ADMIN — EPINEPHRINE 1 MG: 0.1 INJECTION, SOLUTION ENDOTRACHEAL; INTRACARDIAC; INTRAVENOUS at 03:44

## 2020-01-01 RX ADMIN — SODIUM CHLORIDE 1000 ML: 0.9 INJECTION, SOLUTION INTRAVENOUS at 03:32

## 2020-01-01 RX ADMIN — EPINEPHRINE 1 MG: 0.1 INJECTION, SOLUTION ENDOTRACHEAL; INTRACARDIAC; INTRAVENOUS at 03:48

## 2020-01-01 RX ADMIN — EPINEPHRINE 16 MCG/MIN: 1 INJECTION, SOLUTION, CONCENTRATE INTRAVENOUS at 03:55

## 2020-01-01 RX ADMIN — MAGNESIUM SULFATE HEPTAHYDRATE 2 G: 500 INJECTION, SOLUTION INTRAMUSCULAR; INTRAVENOUS at 03:35

## 2020-01-01 RX ADMIN — MAGNESIUM SULFATE HEPTAHYDRATE 2 G: 500 INJECTION, SOLUTION INTRAMUSCULAR; INTRAVENOUS at 03:30

## 2020-01-01 RX ADMIN — MAGNESIUM SULFATE HEPTAHYDRATE 2 G: 500 INJECTION, SOLUTION INTRAMUSCULAR; INTRAVENOUS at 03:37

## 2020-01-01 RX ADMIN — EPINEPHRINE 1 MG: 0.1 INJECTION, SOLUTION ENDOTRACHEAL; INTRACARDIAC; INTRAVENOUS at 03:55

## 2020-01-01 RX ADMIN — EPINEPHRINE 1 MG: 0.1 INJECTION, SOLUTION ENDOTRACHEAL; INTRACARDIAC; INTRAVENOUS at 03:27

## 2020-01-02 NOTE — CODE DOCUMENTATION
After changing pt out of t-shirt and into hospital gown  Pt sat up from stretcher and started to dry-heave, I asked pt if he needed a bag to vomit into or if he wanted anything for nausea  Pt reported he did not need anything and layed back down on stretcher  Pt complained that he was cold and freezing, I left pts room to get him a warm blanket  Upon returning to pts room with blanket pt started to lexi Castro was called to pts bedside  Cardiac monitor showed v-fib and pt was not responding to verbal or noxious stimuli  CPR was initiated and pt placed on defibrillator

## 2020-01-02 NOTE — ED NOTES
Per [facility], patient belongings (earrings, , cell phone and clothes) can be sent home with patient family     Delaura Jomar, [facility]  01/02/20 0912

## 2020-01-02 NOTE — ED PROVIDER NOTES
History  Chief Complaint   Patient presents with    Chest Pain     pt reports chest pain for one month, was given naproxen and antiacid, pt reports tonight sudden onset of burning in chest, reports pain travels to his back, up his neck and down his shoulders, pt diaphrotic in triage, recieved 324 ASA en route     Patient presents via EMS for chest pain  EMS told me that he started having chest pain about a month ago  He was taking anti-inflammatories without relief  He told EMS that his cholesterol was elevated and he was supposed to follow-up with cardiology but has not done so as of yet  EMS reports to me that he was diaphoretic and uncomfortable with abnormal, peaked T-waves on EKG  They also told me that he had a long pause on his EKG as well and patient was diaphoretic with chest pain at this time  By the time I saw the patient he was unresponsive and shaking  I looked up on the monitor and saw the patient was in ventricular fibrillation  Patient did not have a pulse so CPR and ACLS was started  History provided by:  EMS personnel  History limited by:  Acuity of condition   used: No    Chest Pain   Duration:  1 month  Progression:  Worsening  Ineffective treatments: NSAIDs  Risk factors: high cholesterol, male sex and smoking        None       History reviewed  No pertinent past medical history  History reviewed  No pertinent surgical history  History reviewed  No pertinent family history  I have reviewed and agree with the history as documented  Social History     Tobacco Use    Smoking status: Never Smoker   Substance Use Topics    Alcohol use: Yes     Comment: socially    Drug use: Yes     Types: Marijuana        Review of Systems   Unable to perform ROS: Acuity of condition   Cardiovascular: Positive for chest pain  Physical Exam  Physical Exam   Constitutional: He has a sickly appearance  He appears ill  HENT:   Head: Normocephalic and atraumatic  Mouth/Throat: Mucous membranes are pale  Eyes: Pupils are equal, round, and reactive to light  Neck: Neck supple  No tracheal deviation present  Cardiovascular: Tachycardia present  Patient is tachycardic on the monitor showing ventricular fibrillation  Pulses are weak   Pulmonary/Chest: Effort normal and breath sounds normal  No stridor  No respiratory distress  Patient is breathing on his own  Abdominal: Soft  He exhibits no distension  Musculoskeletal:        Right lower leg: He exhibits no edema  Left lower leg: He exhibits no edema  Neurological: He is unresponsive  Tremors and shaking   Skin: He is diaphoretic  There is pallor  Nursing note and vitals reviewed        Vital Signs  ED Triage Vitals [01/02/20 0309]   Temp Pulse Respirations Blood Pressure SpO2   -- 102 22 137/68 100 %      Temp src Heart Rate Source Patient Position - Orthostatic VS BP Location FiO2 (%)   -- Monitor Lying Right arm --      Pain Score       --           Vitals:    01/02/20 0309 01/02/20 0334 01/02/20 0402   BP: 137/68     Pulse: 102 (!) 176 (!) 0   Patient Position - Orthostatic VS: Lying           Visual Acuity      ED Medications  Medications   amiodarone (CORDARONE) 900 mg in dextrose 5 % 500 mL infusion (0 mg/min Intravenous Stopped 1/2/20 0403)   EPINEPHrine 3000 mcg (STANDARD CONCENTRATION) IV in sodium chloride 0 9% 250 mL (0 mcg/min Intravenous Stopped 1/2/20 0403)   EPINEPHrine (ADRENALIN) injection (1 mg Intravenous Given 1/2/20 0358)   lidocaine (PF) (XYLOCAINE-MPF) 100 mg/5 mL injection (100 mg Intravenous Given 1/2/20 0328)   magnesium sulfate 1 g/2 mL injection (2 g Intravenous Given 1/2/20 0343)   amiodarone 150 mg/3 mL injection (300 mg Intravenous Given 1/2/20 0333)   sodium chloride 0 9 % bolus 1,000 mL (0 mL Intravenous Stopped 1/2/20 0403)   magnesium sulfate 1 g/2 mL injection (2 g Intravenous Given 1/2/20 0330)   magnesium sulfate 1 g/2 mL injection (2 g Intravenous Given 1/2/20 0335)   lidocaine (PF) (XYLOCAINE-MPF) 100 mg/5 mL injection (50 mg Intravenous Given 1/2/20 0341)       Diagnostic Studies  Results Reviewed     Procedure Component Value Units Date/Time    Acetaminophen level-If concentration is detectable, please discuss with medical  on call  [[id number]]  (Abnormal) Collected:  01/02/20 0342    Lab Status:  Final result Specimen:  Blood from Arm, Right Updated:  01/02/20 0612     Acetaminophen Level <2 ug/mL     Salicylate level [[id number]]  (Abnormal) Collected:  01/02/20 0342    Lab Status:  Final result Specimen:  Blood from Arm, Right Updated:  64/86/90 0671     Salicylate Lvl <3 mg/dL     TSH [[id number]]  (Normal) Collected:  01/02/20 0342    Lab Status:  Final result Specimen:  Blood from Arm, Right Updated:  01/02/20 0540     TSH 3RD GENERATON 1 116 uIU/mL     Narrative:       Patients undergoing fluorescein dye angiography may retain small amounts of fluorescein in the body for 48-72 hours post procedure  Samples containing fluorescein can produce falsely depressed TSH values  If the patient had this procedure,a specimen should be resubmitted post fluorescein clearance        NT-BNP PRO [[id number]]  (Normal) Collected:  01/02/20 0342    Lab Status:  Final result Specimen:  Blood from Arm, Right Updated:  01/02/20 0540     NT-proBNP 34 pg/mL     Magnesium [[id number]]  (Normal) Collected:  01/02/20 0342    Lab Status:  Final result Specimen:  Blood from Arm, Right Updated:  01/02/20 0500     Magnesium 2 0 mg/dL     Comprehensive metabolic panel [[id number]]  (Abnormal) Collected:  01/02/20 0342    Lab Status:  Final result Specimen:  Blood from Arm, Right Updated:  01/02/20 0446     Sodium 142 mmol/L      Potassium 3 9 mmol/L      Chloride 100 mmol/L      CO2 26 mmol/L      ANION GAP 16 mmol/L      BUN 13 mg/dL      Creatinine 0 99 mg/dL      Glucose 133 mg/dL      Calcium 9 9 mg/dL      AST 36 U/L      ALT 98 U/L      Alkaline Phosphatase 131 U/L      Total Protein 8 0 g/dL      Albumin 4 4 g/dL      Total Bilirubin 0 38 mg/dL      eGFR 103 ml/min/1 73sq m     Narrative:       [place] guidelines for Chronic Kidney Disease (CKD):     Stage 1 with normal or high GFR (GFR > 90 mL/min/1 73 square meters)    Stage 2 Mild CKD (GFR = 60-89 mL/min/1 73 square meters)    Stage 3A Moderate CKD (GFR = 45-59 mL/min/1 73 square meters)    Stage 3B Moderate CKD (GFR = 30-44 mL/min/1 73 square meters)    Stage 4 Severe CKD (GFR = 15-29 mL/min/1 73 square meters)    Stage 5 End Stage CKD (GFR <15 mL/min/1 73 square meters)  Note: GFR calculation is accurate only with a steady state creatinine    Troponin I [[id number]]  (Abnormal) Collected:  01/02/20 0342    Lab Status:  Final result Specimen:  Blood from Arm, Right Updated:  01/02/20 0409     Troponin I 0 18 ng/mL     Ethanol [[id number]]  (Abnormal) Collected:  01/02/20 0342    Lab Status:  Final result Specimen:  Blood from Arm, Right Updated:  01/02/20 0402     Ethanol Lvl 5 mg/dL     CBC and differential [[id number]]  (Abnormal) Collected:  01/02/20 0342    Lab Status:  Final result Specimen:  Blood from Arm, Right Updated:  01/02/20 0350     WBC 18 42 Thousand/uL      RBC 5 41 Million/uL      Hemoglobin 15 9 g/dL      Hematocrit 47 4 %      MCV 88 fL      MCH 29 4 pg      MCHC 33 5 g/dL      RDW 12 6 %      MPV 8 9 fL      Platelets 287 Thousands/uL      nRBC 0 /100 WBCs      Neutrophils Relative 64 %      Immat GRANS % 0 %      Lymphocytes Relative 26 %      Monocytes Relative 6 %      Eosinophils Relative 3 %      Basophils Relative 1 %      Neutrophils Absolute 12 07 Thousands/µL      Immature Grans Absolute 0 07 Thousand/uL      Lymphocytes Absolute 4 70 Thousands/µL      Monocytes Absolute 1 01 Thousand/µL      Eosinophils Absolute 0 46 Thousand/µL      Basophils Absolute 0 11 Thousands/µL     POCT Chem 8+ [[id number]]  (Abnormal) Collected:  01/02/20 0331    Lab Status:  Final result Specimen: Venous Updated:  01/02/20 0335     SODIUM, I-STAT 138 mmol/l      Potassium, i-STAT 3 8 mmol/L      Chloride, istat 103 mmol/L      CO2, i-STAT 26 mmol/L      Anion Gap, i-STAT 13 mmol/L      Calcium, Ionized i-STAT 1 08 mmol/L      BUN, I-STAT 15 mg/dl      Creatinine, i-STAT 0 9 mg/dl      eGFR 116 ml/min/1 73sq m      Glucose, i-STAT 137 mg/dl      Hct, i-STAT 48 %      Hgb, i-STAT 16 3 g/dl      Specimen Type VENOUS    Narrative:       [employer] guidelines for Chronic Kidney Disease (CKD):     Stage 1 with normal or high GFR (GFR > 90 mL/min/1 73 square meters)    Stage 2 Mild CKD (GFR = 60-89 mL/min/1 73 square meters)    Stage 3A Moderate CKD (GFR = 45-59 mL/min/1 73 square meters)    Stage 3B Moderate CKD (GFR = 30-44 mL/min/1 73 square meters)    Stage 4 Severe CKD (GFR = 15-29 mL/min/1 73 square meters)    Stage 5 End Stage CKD (GFR <15 mL/min/1 73 square meters)  Note: GFR calculation is accurate only with a steady state creatinine                 No orders to display              Procedures  ECG 12 Lead Documentation Only  Date/Time: 1/2/2020 3:17 AM  Performed by: Astrid Gamble DO  Authorized by: Astrid Gamble DO     Indications / Diagnosis:  Chest pain  ECG reviewed by me, the ED Provider: yes    Patient location:  ED  Previous ECG:     Previous ECG:  Unavailable  Interpretation:     Interpretation: abnormal    Quality:     Tracing quality:  Limited by artifact  Rate:     ECG rate:  100    ECG rate assessment: normal    Rhythm:     Rhythm: sinus rhythm and sinus bradycardia    Ectopy:     Ectopy: none    QRS:     QRS intervals:  Normal  Conduction:     Conduction: normal    ST segments:     ST segments:  Non-specific  T waves:     T waves: peaked and inverted      Peaked:   I    Inverted:  III, aVF, aVR, V1 and V6  ECG 12 Lead Documentation Only  Date/Time: 1/2/2020 3:34 AM  Performed by: Astrid Gamble DO  Authorized by: Astrid Gamble DO Indications / Diagnosis:  Repeat for chest pain and arrhythmia  ECG reviewed by me, the ED Provider: yes    Patient location:  ED  Previous ECG:     Previous ECG:  Compared to current    Similarity:  Changes noted  Interpretation:     Interpretation: abnormal    Rate:     ECG rate:  127    ECG rate assessment: tachycardic    Rhythm:     Rhythm: other rhythm    Ectopy:     Ectopy: PVCs    QRS:     QRS intervals:  Normal  Conduction:     Conduction: normal    ST segments:     ST segments:  Non-specific  T waves:     T waves: non-specific    ECG 12 Lead Documentation Only  Date/Time: 1/2/2020 3:51 AM  Performed by: Delroy Clifton DO  Authorized by: Delroy Clifton DO     Indications / Diagnosis:  Repeat for arrhythmia  ECG reviewed by me, the ED Provider: yes    Patient location:  ED  Previous ECG:     Previous ECG:  Compared to current    Similarity:  Changes noted  Interpretation:     Interpretation: abnormal    Rate:     ECG rate:  71    ECG rate assessment: normal    Rhythm:     Rhythm: junctional    Ectopy:     Ectopy: none    QRS:     QRS axis:  Normal    QRS intervals:  Normal  Conduction:     Conduction: normal    ST segments:     ST segments:  Non-specific  T waves:     T waves: non-specific    CriticalCare Time  Performed by: Delroy Clifton DO  Authorized by: Delroy Clifton DO     Critical care provider statement:     Critical care time (minutes):  60    Critical care time was exclusive of:  Separately billable procedures and treating other patients and teaching time    Critical care was necessary to treat or prevent imminent or life-threatening deterioration of the following conditions:  Circulatory failure and cardiac failure    Critical care was time spent personally by me on the following activities:  Blood draw for specimens, obtaining history from patient or surrogate, development of treatment plan with patient or surrogate, discussions with consultants, evaluation of patient's response to treatment, examination of patient, interpretation of cardiac output measurements, ordering and performing treatments and interventions, ordering and review of laboratory studies, ordering and review of radiographic studies, review of old charts and re-evaluation of patient's condition    I assumed direction of critical care for this patient from another provider in my specialty: no    POC Cardiac US  Date/Time: 1/2/2020 3:15 AM  Performed by: Concepcion Lira DO  Authorized by: Concepcion Lira DO     Patient location:  ED  Other Assisting Provider: No    Procedure details:     Indications: cardiac arrest      Assess for: evaluate cardiac function      Exam Type: initial exam    Patient Details:     Cardiac Rhythm:  Irregular    Mechanical ventilation: No    Cardiac findings:     Image Quality: diagnostic      Echo Technique: limited 2D      Images Obtained: subcostal      Pericardial Effusion: absent      Wall Motion: hypodynamic    Interpretation:      Irregular heart wall motion that appears consistent with VFib that is currently on the monitor    POC Cardiac US  Date/Time: 1/2/2020 4:02 AM  Performed by: Concepcion Lira DO  Authorized by: Concepcion Lira DO     Patient location:  ED  Other Assisting Provider: No    Procedure details:     Indications: cardiac arrest      Assess for: evaluate cardiac function      Exam Type: follow-up exam    Cardiac findings:     Image Quality: diagnostic      Echo Technique: limited 2D      Images Obtained: subcostal      Pericardial Effusion: absent      Wall Motion: hypodynamic      LV Systolic Function: absent    Interpretation:      Asystole              ED Course                               MDM  Number of Diagnoses or Management Options  Cardiac arrest [facility]): new and requires workup  Chest pain: new and requires workup  Elevated troponin: new and requires workup  Ventricular fibrillation ([address] ): new and requires workup  Diagnosis management comments: Patient is a 58-year-old male that presented tonight for chest pain via EMS  When I saw the patient he was shaking and in ventricular fibrillation  He looked like he was having and a VFib seizure  Patient had a a very weakened pulse so CPR and ACLS was started  Initial cardiac ultrasound showed ventricular fibrillation without pericardial effusion  During resuscitation patient was in and out of ventricular fibrillation and torsades  Patient treated with antiarrhythmic medications, magnesium and multiple electric shocks  In the middle of resuscitation the patient did wake up and was able to answer questions for me  I asked me if he had chest pain and he shook his head no  Patient was in normal sinus rhythm at this time  EKG was improved  This did not last long however and the patient went back into VFib arrest   Patient continued with ACLS protocol, anti arrhythmic medications and electric shocks but despite these efforts the patient went into asystole  Patient had a prolonged period of asystole and this was confirmed on cardiac ultrasound so resuscitation efforts were stopped  Time of death was 4:02 a m       4:25 AM  Spoke with pt's wife via the phone  She is unable to come to the hospital because of lack of transportation and she does not have a 's license  We will try to have the [facility] police department go get her  Patient is a  case   evaluated the patient and spoke to the family with myself         Amount and/or Complexity of Data Reviewed  Clinical lab tests: ordered and reviewed  Tests in the medicine section of CPT®: reviewed and ordered  Review and summarize past medical records: yes    Patient Progress  Patient progress: other (comment)        Disposition  Final diagnoses:   Chest pain   Elevated troponin   Ventricular fibrillation ([facility] [address] )   Cardiac arrest [facility])     Time reflects when diagnosis was documented in both MDM as applicable and the Disposition within this note     Time User Action Codes Description Comment    2020  5:07 AM [place] Gola Add [R07 9] Chest pain     2020  5:07 AM [place] Gola Add [R79 89] Elevated troponin     2020  5:08 AM RishieriglioGuille Bratrinity Add [I49 01] Ventricular fibrillation ([address] )     2020  5:08 AM RishieriglioGuille Bratrinity Add [I46 9] Cardiac arrest [facility])       ED Disposition     ED Disposition Condition Date/Time Comment      Thu 2020  5:07 AM       Follow-up Information    None         Patient's Medications    No medications on file     No discharge procedures on file      ED Provider  Electronically Signed by           Ganga Fernandez DO  20 [phone]

## 2020-01-03 LAB
ATRIAL RATE: 500 BPM
QRS AXIS: 33 DEGREES
QRSD INTERVAL: 94 MS
QT INTERVAL: 372 MS
QTC INTERVAL: 404 MS
T WAVE AXIS: 261 DEGREES
VENTRICULAR RATE: 71 BPM